# Patient Record
Sex: FEMALE | Race: WHITE | Employment: OTHER | URBAN - METROPOLITAN AREA
[De-identification: names, ages, dates, MRNs, and addresses within clinical notes are randomized per-mention and may not be internally consistent; named-entity substitution may affect disease eponyms.]

---

## 2018-11-05 ENCOUNTER — OFFICE VISIT (OUTPATIENT)
Dept: DERMATOLOGY | Facility: CLINIC | Age: 68
End: 2018-11-05
Payer: COMMERCIAL

## 2018-11-05 VITALS — HEART RATE: 71 BPM | DIASTOLIC BLOOD PRESSURE: 80 MMHG | SYSTOLIC BLOOD PRESSURE: 112 MMHG | OXYGEN SATURATION: 99 %

## 2018-11-05 DIAGNOSIS — L82.1 SK (SEBORRHEIC KERATOSIS): ICD-10-CM

## 2018-11-05 DIAGNOSIS — L57.0 AK (ACTINIC KERATOSIS): Primary | ICD-10-CM

## 2018-11-05 DIAGNOSIS — L81.4 LENTIGO: ICD-10-CM

## 2018-11-05 PROCEDURE — 99203 OFFICE O/P NEW LOW 30 MIN: CPT | Mod: 25 | Performed by: DERMATOLOGY

## 2018-11-05 PROCEDURE — 17000 DESTRUCT PREMALG LESION: CPT | Performed by: DERMATOLOGY

## 2018-11-05 PROCEDURE — 17003 DESTRUCT PREMALG LES 2-14: CPT | Performed by: DERMATOLOGY

## 2018-11-05 RX ORDER — MUPIROCIN 20 MG/G
OINTMENT TOPICAL
COMMUNITY
Start: 2016-03-18 | End: 2023-11-20

## 2018-11-05 RX ORDER — MAGNESIUM OXIDE 400 MG/1
400 TABLET ORAL
COMMUNITY
Start: 2017-05-25

## 2018-11-05 RX ORDER — DIPHENOXYLATE HYDROCHLORIDE AND ATROPINE SULFATE 2.5; .025 MG/1; MG/1
1 TABLET ORAL
COMMUNITY
Start: 2014-07-30

## 2018-11-05 RX ORDER — CITALOPRAM HYDROBROMIDE 40 MG/1
40 TABLET ORAL
COMMUNITY
Start: 2018-08-16

## 2018-11-05 RX ORDER — CLONAZEPAM 0.5 MG/1
TABLET ORAL
COMMUNITY
Start: 2018-08-16

## 2018-11-05 NOTE — LETTER
11/5/2018         RE: Domi Nolen  [address] [place] [id number]        Dear Colleague,    Thank you for referring your patient, Domi Nolen, to the [facility]. Please see a copy of my visit note below.    Domi Nolen is a 68 year old year old female patient here today for spot on nose.   .  Patient states this has been present for a while.  Patient reports the following symptoms:  Crusting and scalp .  Patient reports the following previous treatments none.  Patient reports the following modifying factors none.  Associated symptoms: none.  Patient has no other skin complaints today.  Remainder of the HPI, Meds, PMH, Allergies, FH, and SH was reviewed in chart.    History reviewed. No pertinent past medical history.    History reviewed. No pertinent surgical history.     Family History   Problem Relation Age of Onset     Cancer Mother      skin ca     Cancer Father      skin ca     Melanoma Maternal Aunt        Social History     Social History     Marital status:      Spouse name: N/A     Number of children: N/A     Years of education: N/A     Occupational History     Not on file.     Social History Main Topics     Smoking status: Never Smoker     Smokeless tobacco: Never Used     Alcohol use Not on file     Drug use: Not on file     Sexual activity: Not on file     Other Topics Concern     Not on file     Social History Narrative     No narrative on file       Outpatient Encounter Prescriptions as of 11/5/2018   Medication Sig Dispense Refill     ALPRAZolam (XANAX PO)        calcium carbonate 600 mg-vitamin D 400 units (CALTRATE) 600-400 MG-UNIT per tablet Take 1 tablet by mouth       citalopram (CELEXA) 40 MG tablet Take 40 mg by mouth       clonazePAM (KLONOPIN) 0.5 MG tablet TAKE 1 TABLET ORALLY at bedtime  FOR RESTLESS LEG SYNDROME OR ANXIETY AS NEEDED       magnesium oxide (MAG-OX) 400 MG tablet Take 400 mg by mouth       Multiple Vitamin (MULTI-VITAMINS) TABS Take  1 tablet by mouth       mupirocin (BACTROBAN) 2 % ointment        No facility-administered encounter medications on file as of 11/5/2018.              Review Of Systems  Skin: As above  Eyes: negative  Ears/Nose/Throat: negative  Respiratory: No shortness of breath, dyspnea on exertion, cough, or hemoptysis  Cardiovascular: negative  Gastrointestinal: negative  Genitourinary: negative  Musculoskeletal: negative  Neurologic: negative  Psychiatric: negative  Hematologic/Lymphatic/Immunologic: negative  Endocrine: negative      O:   NAD, WDWN, Alert & Oriented, Mood & Affect wnl, Vitals stable   Here today alone   /80 (BP Location: Right arm, Patient Position: Sitting, Cuff Size: Adult Regular)  Pulse 71  SpO2 99%   General appearance normal   Vitals stable   Alert, oriented and in no acute distress     R nasal tip and bridge scaly papule   Stuck on papules and brown macules on trunk and ext      The remainder of expanded problem focused exam was unremarkable; the following areas were examined:  scalp/hair, conjunctiva/lids, face, neck, lips      Eyes: Conjunctivae/lids:Normal     ENT: Lips, buccal mucosa, tongue: normal    MSK:Normal    Cardiovascular: peripheral edema none    Pulm: Breathing Normal    Lymph Nodes: No Head and Neck Lymphadenopathy     Neuro/Psych: Orientation:Normal; Mood/Affect:Normal      A/P:  1. Actinic keratosis x2  LN2:  Treated with LN2 for 5s for 1-2 cycles. Warned risks of blistering, pain, pigment change, scarring, and incomplete resolution.  Advised patient to return if lesions do not completely resolve.  Wound care sheet given.  Pathophysiology discussed with pateint   1. Seborrheic keratosis, lentigo,   BENIGN LESIONS DISCUSSED WITH PATIENT:  I discussed the specifics of tumor, prognosis, and genetics of benign lesions.  I explained that treatment of these lesions would be purely cosmetic and not medically neccessary.  I discussed with patient different removal options including  excision, cautery and /or laser.      Nature and genetics of benign skin lesions dicussed with patient.  Signs and Symptoms of skin cancer discussed with patient.  ABCDEs of melanoma reviewed with patient.  Patient encouraged to perform monthly skin exams.  UV precautions reviewed with patient.  Patient to follow up with Primary Care provider regarding elevated blood pressure.  Skin care regimen reviewed with patient: Eliminate harsh soaps, i.e. Dial, zest, irsih spring; Mild soaps such as Cetaphil or Dove sensitive skin, avoid hot or cold showers, aggressive use of emollients including vanicream, cetaphil or cerave discussed with patient.    Risks of non-melanoma skin cancer discussed with patient   Return to clinic 3 months  Patient to follow up with Primary Care provider regarding elevated blood pressure.        Again, thank you for allowing me to participate in the care of your patient.        Sincerely,        James Dominguez MD

## 2018-11-05 NOTE — NURSING NOTE
Initial /80 (BP Location: Right arm, Patient Position: Sitting, Cuff Size: Adult Regular)  Pulse 71  SpO2 99% There is no height or weight on file to calculate BMI. .

## 2018-11-05 NOTE — MR AVS SNAPSHOT
After Visit Summary   11/5/2018    Domi Nolen    MRN: [id number]           Patient Information     Date Of Birth          1950        Visit Information        Provider Department      11/5/2018 10:45 AM James Dominguez MD [facility]        Today's Diagnoses     AK (actinic keratosis)    -  1    Lentigo        SK (seborrheic keratosis)          Care Instructions    WOUND CARE INSTRUCTIONS   FOR CRYOSURGERY   This area treated with liquid nitrogen should form a blister (areas treated may or may not blister-skin may just turn dark and slough off). You do not need to bandage the area unless a blister forms and breaks (which may be a few days). When the blister breaks, begin daily dressing changes as follows:  1) Clean and dry the area with tap water using clean Q-tip or sterile gauze pad.   2) Apply Polysporin ointment or Bacitracin ointment over entire wound. Do NOT use Neosporin ointment.   3) Cover the wound with a band-aid or sterile non-stick gauze pad and micropore paper tape.   REPEAT THESE INSTRUCTIONS AT LEAST ONCE A DAY UNTIL THE WOUND HAS COMPLETELY HEALED.   It is an old wives tale that a wound heals better when it is exposed to air and allowed to dry out. The wound will heal faster with a better cosmetic result if it is kept moist with ointment and covered with a bandage.   Do not let the wound dry out.   IMPORTANT INFORMATION ON REVERSE SIDE   Supplies Needed:   *Cotton tipped applicators (Q-tips)   *Polysporin ointment or Bacitracin ointment (NOT NEOSPORIN)   *Band-aids, or non stick gauze pads and micropore paper tape   PATIENT INFORMATION   During the healing process you will notice a number of changes. All wounds develop a small halo of redness surrounding the wound. This means healing is occurring. Severe itching with extensive redness usually indicates sensitivity to the ointment or bandage tape used to dress the wound. You should call our office if this  develops.   Swelling and/or discoloration around your surgical site is common, particularly when performed around the eye.   All wounds normally drain. The larger the wound the more drainage there will be. After 7-10 days, you will notice the wound beginning to shrink and new skin will begin to grow. The wound is healed when you can see skin has formed over the entire area. A healed wound has a healthy, shiny look to the surface and is red to dark pink in color to normalize. Wounds may take approximately 4-6 weeks to heal. Larger wounds may take 6-8 weeks. After the wound is healed you may discontinue dressing changes.   You may experience a sensation of tightness as your wound heals. This is normal and will gradually subside.   Your healed wound may be sensitive to temperature changes. This sensitivity improves with time, but if you re having a lot of discomfort, try to avoid temperature extremes.   Patients frequently experience itching after their wound appears to have healed because of the continue healing under the skin. Plain Vaseline will help relieve the itching.                 Follow-ups after your visit        Who to contact     If you have questions or need follow up information about today's clinic visit or your schedule please contact [facility] directly at [phone].  Normal or non-critical lab and imaging results will be communicated to you by MyChart, letter or phone within 4 business days after the clinic has received the results. If you do not hear from us within 7 days, please contact the clinic through [facility] or phone. If you have a critical or abnormal lab result, we will notify you by phone as soon as possible.  Submit refill requests through [employer] or call your pharmacy and they will forward the refill request to us. Please allow 3 business days for your refill to be completed.          Additional Information About Your Visit        [employer] Information     [employer] lets you  "send messages to your doctor, view your test results, renew your prescriptions, schedule appointments and more. To sign up, go to [email or web address] . Click on \"Log in\" on the left side of the screen, which will take you to the Welcome page. Then click on \"Sign up Now\" on the right side of the page.     You will be asked to enter the access code listed below, as well as some personal information. Please follow the directions to create your username and password.     Your access code is: [email or web address]  Expires: 2/3/2019 10:54 AM     Your access code will  in 90 days. If you need help or a new code, please call your [facility] clinic or [phone].        Care EveryWhere ID     This is your [facility] EveryWhere ID. This could be used by other organizations to access your [facility] medical records  [id number]        Your Vitals Were     Pulse Pulse Oximetry                71 99%           Blood Pressure from Last 3 Encounters:   18 112/80    Weight from Last 3 Encounters:   No data found for Wt              We Performed the Following     DESTRUCT PREMALIGNANT LESION, 2-14     DESTRUCT PREMALIGNANT LESION, FIRST        Primary Care Provider Office Phone # Fax #    Rachel KATE Jaime [phone]       [facility] [address] [address]        Equal Access to Services     RADHA BRAUN : Hadii aad ku hadasho Soomaali, waaxda luqadaha, qaybta kaalmada adenabor, benito kirkpatrick . So [facility] [phone].    ATENCIÓN: Si habla español, tiene a mike disposición servicios gratuitos de asistencia lingüística. Tony al [phone].    We comply with applicable federal civil rights laws and [place] laws. We do not discriminate on the basis of race, color, national origin, age, disability, sex, sexual orientation, or gender identity.            Thank you!     Thank you for choosing [facility]  for your care. Our goal is always to provide " you with excellent care. Hearing back from our patients is one way we can continue to improve our services. Please take a few minutes to complete the written survey that you may receive in the mail after your visit with us. Thank you!             Your Updated Medication List - Protect others around you: Learn how to safely use, store and throw away your medicines at www.disposemymeds.org.          This list is accurate as of 11/5/18 11:17 AM.  Always use your most recent med list.                   Brand Name Dispense Instructions for use Diagnosis    calcium carbonate 600 mg-vitamin D 400 units 600-400 MG-UNIT per tablet    CALTRATE     Take 1 tablet by mouth    AK (actinic keratosis), Lentigo, SK (seborrheic keratosis)       citalopram 40 MG tablet    celeXA     Take 40 mg by mouth    AK (actinic keratosis), Lentigo, SK (seborrheic keratosis)       clonazePAM 0.5 MG tablet    klonoPIN     TAKE 1 TABLET ORALLY at bedtime  FOR RESTLESS LEG SYNDROME OR ANXIETY AS NEEDED    AK (actinic keratosis), Lentigo, SK (seborrheic keratosis)       magnesium oxide 400 MG tablet    MAG-OX     Take 400 mg by mouth    AK (actinic keratosis), Lentigo, SK (seborrheic keratosis)       MULTI-VITAMINS Tabs      Take 1 tablet by mouth    AK (actinic keratosis), Lentigo, SK (seborrheic keratosis)       mupirocin 2 % ointment    BACTROBAN      AK (actinic keratosis), Lentigo, SK (seborrheic keratosis)       XANAX PO       AK (actinic keratosis), Lentigo, SK (seborrheic keratosis)

## 2018-11-05 NOTE — PROGRESS NOTES
Domi Nolen is a 68 year old year old female patient here today for spot on nose.   .  Patient states this has been present for a while.  Patient reports the following symptoms:  Crusting and scalp .  Patient reports the following previous treatments none.  Patient reports the following modifying factors none.  Associated symptoms: none.  Patient has no other skin complaints today.  Remainder of the HPI, Meds, PMH, Allergies, FH, and SH was reviewed in chart.    History reviewed. No pertinent past medical history.    History reviewed. No pertinent surgical history.     Family History   Problem Relation Age of Onset     Cancer Mother      skin ca     Cancer Father      skin ca     Melanoma Maternal Aunt        Social History     Social History     Marital status:      Spouse name: N/A     Number of children: N/A     Years of education: N/A     Occupational History     Not on file.     Social History Main Topics     Smoking status: Never Smoker     Smokeless tobacco: Never Used     Alcohol use Not on file     Drug use: Not on file     Sexual activity: Not on file     Other Topics Concern     Not on file     Social History Narrative     No narrative on file       Outpatient Encounter Prescriptions as of 11/5/2018   Medication Sig Dispense Refill     ALPRAZolam (XANAX PO)        calcium carbonate 600 mg-vitamin D 400 units (CALTRATE) 600-400 MG-UNIT per tablet Take 1 tablet by mouth       citalopram (CELEXA) 40 MG tablet Take 40 mg by mouth       clonazePAM (KLONOPIN) 0.5 MG tablet TAKE 1 TABLET ORALLY at bedtime  FOR RESTLESS LEG SYNDROME OR ANXIETY AS NEEDED       magnesium oxide (MAG-OX) 400 MG tablet Take 400 mg by mouth       Multiple Vitamin (MULTI-VITAMINS) TABS Take 1 tablet by mouth       mupirocin (BACTROBAN) 2 % ointment        No facility-administered encounter medications on file as of 11/5/2018.              Review Of Systems  Skin: As above  Eyes: negative  Ears/Nose/Throat:  negative  Respiratory: No shortness of breath, dyspnea on exertion, cough, or hemoptysis  Cardiovascular: negative  Gastrointestinal: negative  Genitourinary: negative  Musculoskeletal: negative  Neurologic: negative  Psychiatric: negative  Hematologic/Lymphatic/Immunologic: negative  Endocrine: negative      O:   NAD, WDWN, Alert & Oriented, Mood & Affect wnl, Vitals stable   Here today alone   /80 (BP Location: Right arm, Patient Position: Sitting, Cuff Size: Adult Regular)  Pulse 71  SpO2 99%   General appearance normal   Vitals stable   Alert, oriented and in no acute distress     R nasal tip and bridge scaly papule   Stuck on papules and brown macules on trunk and ext      The remainder of expanded problem focused exam was unremarkable; the following areas were examined:  scalp/hair, conjunctiva/lids, face, neck, lips      Eyes: Conjunctivae/lids:Normal     ENT: Lips, buccal mucosa, tongue: normal    MSK:Normal    Cardiovascular: peripheral edema none    Pulm: Breathing Normal    Lymph Nodes: No Head and Neck Lymphadenopathy     Neuro/Psych: Orientation:Normal; Mood/Affect:Normal      A/P:  1. Actinic keratosis x2  LN2:  Treated with LN2 for 5s for 1-2 cycles. Warned risks of blistering, pain, pigment change, scarring, and incomplete resolution.  Advised patient to return if lesions do not completely resolve.  Wound care sheet given.  Pathophysiology discussed with pateint   1. Seborrheic keratosis, lentigo,   BENIGN LESIONS DISCUSSED WITH PATIENT:  I discussed the specifics of tumor, prognosis, and genetics of benign lesions.  I explained that treatment of these lesions would be purely cosmetic and not medically neccessary.  I discussed with patient different removal options including excision, cautery and /or laser.      Nature and genetics of benign skin lesions dicussed with patient.  Signs and Symptoms of skin cancer discussed with patient.  ABCDEs of melanoma reviewed with patient.  Patient  encouraged to perform monthly skin exams.  UV precautions reviewed with patient.  Patient to follow up with Primary Care provider regarding elevated blood pressure.  Skin care regimen reviewed with patient: Eliminate harsh soaps, i.e. Dial, zest, irsih spring; Mild soaps such as Cetaphil or Dove sensitive skin, avoid hot or cold showers, aggressive use of emollients including vanicream, cetaphil or cerave discussed with patient.    Risks of non-melanoma skin cancer discussed with patient   Return to clinic 3 months  Patient to follow up with Primary Care provider regarding elevated blood pressure.

## 2021-02-09 ENCOUNTER — OFFICE VISIT (OUTPATIENT)
Dept: DERMATOLOGY | Facility: CLINIC | Age: 71
End: 2021-02-09
Payer: COMMERCIAL

## 2021-02-09 VITALS — SYSTOLIC BLOOD PRESSURE: 124 MMHG | DIASTOLIC BLOOD PRESSURE: 78 MMHG | HEART RATE: 63 BPM | RESPIRATION RATE: 16 BRPM

## 2021-02-09 DIAGNOSIS — D22.9 MULTIPLE BENIGN NEVI: ICD-10-CM

## 2021-02-09 DIAGNOSIS — L81.4 LENTIGO: ICD-10-CM

## 2021-02-09 DIAGNOSIS — D18.01 CHERRY ANGIOMA: ICD-10-CM

## 2021-02-09 DIAGNOSIS — L82.1 SEBORRHEIC KERATOSIS: Primary | ICD-10-CM

## 2021-02-09 PROCEDURE — 99213 OFFICE O/P EST LOW 20 MIN: CPT | Performed by: PHYSICIAN ASSISTANT

## 2021-02-09 NOTE — NURSING NOTE
Initial /78 (BP Location: Left arm, Patient Position: Sitting, Cuff Size: Adult Large)   Pulse 63   Resp 16  There is no height or weight on file to calculate BMI. .    Betty García, CMA

## 2021-02-09 NOTE — LETTER
2/9/2021         RE: Domi Nolen  [address] [facility] [address]        Dear Colleague,    Thank you for referring your patient, Domi Nolen, to the [facility]. Please see a copy of my visit note below.    Domi Nolen is an extremely pleasant 70 year old year old female patient here today for spots on chest. Present for past few months. Denies any pain or bleeding. Patient has no other skin complaints today.  Remainder of the HPI, Meds, PMH, Allergies, FH, and SH was reviewed in chart.   No past medical history on file.    No past surgical history on file.     Family History   Problem Relation Age of Onset     Cancer Mother         skin ca     Cancer Father         skin ca     Melanoma Maternal Aunt        Social History     Socioeconomic History     Marital status:      Spouse name: Not on file     Number of children: Not on file     Years of education: Not on file     Highest education level: Not on file   Occupational History     Not on file   Social Needs     Financial resource strain: Not on file     Food insecurity     Worry: Not on file     Inability: Not on file     Transportation needs     Medical: Not on file     Non-medical: Not on file   Tobacco Use     Smoking status: Never Smoker     Smokeless tobacco: Never Used   Substance and Sexual Activity     Alcohol use: Not on file     Drug use: Not on file     Sexual activity: Not on file   Lifestyle     Physical activity     Days per week: Not on file     Minutes per session: Not on file     Stress: Not on file   Relationships     Social connections     Talks on phone: Not on file     Gets together: Not on file     Attends Christian service: Not on file     Active member of club or organization: Not on file     Attends meetings of clubs or organizations: Not on file     Relationship status: Not on file     Intimate partner violence     Fear of current or ex partner: Not on file     Emotionally abused: Not on  file     Physically abused: Not on file     Forced sexual activity: Not on file   Other Topics Concern     Not on file   Social History Narrative     Not on file       Outpatient Encounter Medications as of 2/9/2021   Medication Sig Dispense Refill     ALPRAZolam (XANAX PO)        calcium carbonate 600 mg-vitamin D 400 units (CALTRATE) 600-400 MG-UNIT per tablet Take 1 tablet by mouth       citalopram (CELEXA) 40 MG tablet Take 40 mg by mouth       clonazePAM (KLONOPIN) 0.5 MG tablet TAKE 1 TABLET ORALLY at bedtime  FOR RESTLESS LEG SYNDROME OR ANXIETY AS NEEDED       magnesium oxide (MAG-OX) 400 MG tablet Take 400 mg by mouth       Multiple Vitamin (MULTI-VITAMINS) TABS Take 1 tablet by mouth       mupirocin (BACTROBAN) 2 % ointment        No facility-administered encounter medications on file as of 2/9/2021.              Review Of Systems  Skin: As above  Eyes: negative  Ears/Nose/Throat: negative  Respiratory: No shortness of breath, dyspnea on exertion, cough      O:   NAD, WDWN, Alert & Oriented, Mood & Affect wnl, Vitals stable   Here today alone   /78 (BP Location: Left arm, Patient Position: Sitting, Cuff Size: Adult Large)   Pulse 63   Resp 16    General appearance normal   Vitals stable   Alert, oriented and in no acute distress     Stuck on papules and brown macules on trunk and ext   Red papules on trunk  Brown papules and macules with regular pigment network and borders    Upper body skin exam is normal     Eyes: Conjunctivae/lids:Normal     ENT: Lips: normal    MSK:Normal    Pulm: Breathing Normal    Neuro/Psych: Orientation:Alert and Orientedx3 ; Mood/Affect:normal   A/P:  1. Seborrheic keratosis, lentigo, angioma, benign nevi   BENIGN LESIONS DISCUSSED WITH PATIENT:  I discussed the specifics of tumor, prognosis, and genetics of benign lesions.  I explained that treatment of these lesions would be purely cosmetic and not medically neccessary.  I discussed with patient different removal  options including excision, cautery and /or laser.      Nature and genetics of benign skin lesions dicussed with patient.  Signs and Symptoms of skin cancer discussed with patient.  ABCDEs of melanoma reviewed with patient.  Patient encouraged to perform monthly skin exams.  UV precautions reviewed with patient.  Skin care regimen reviewed with patient: Eliminate harsh soaps, i.e. Dial, zest, irsih spring; Mild soaps such as Cetaphil or Dove sensitive skin, avoid hot or cold showers, aggressive use of emollients including vanicream, cetaphil or cerave discussed with patient.    Risks of non-melanoma skin cancer discussed with patient   Return to clinic in one year or sooner if needed.           Again, thank you for allowing me to participate in the care of your patient.        Sincerely,        Cristina Cartagena PA-C

## 2021-02-15 NOTE — PROGRESS NOTES
Domi Nolen is an extremely pleasant 70 year old year old female patient here today for spots on chest. Present for past few months. Denies any pain or bleeding. Patient has no other skin complaints today.  Remainder of the HPI, Meds, PMH, Allergies, FH, and SH was reviewed in chart.   No past medical history on file.    No past surgical history on file.     Family History   Problem Relation Age of Onset     Cancer Mother         skin ca     Cancer Father         skin ca     Melanoma Maternal Aunt        Social History     Socioeconomic History     Marital status:      Spouse name: Not on file     Number of children: Not on file     Years of education: Not on file     Highest education level: Not on file   Occupational History     Not on file   Social Needs     Financial resource strain: Not on file     Food insecurity     Worry: Not on file     Inability: Not on file     Transportation needs     Medical: Not on file     Non-medical: Not on file   Tobacco Use     Smoking status: Never Smoker     Smokeless tobacco: Never Used   Substance and Sexual Activity     Alcohol use: Not on file     Drug use: Not on file     Sexual activity: Not on file   Lifestyle     Physical activity     Days per week: Not on file     Minutes per session: Not on file     Stress: Not on file   Relationships     Social connections     Talks on phone: Not on file     Gets together: Not on file     Attends Faith service: Not on file     Active member of club or organization: Not on file     Attends meetings of clubs or organizations: Not on file     Relationship status: Not on file     Intimate partner violence     Fear of current or ex partner: Not on file     Emotionally abused: Not on file     Physically abused: Not on file     Forced sexual activity: Not on file   Other Topics Concern     Not on file   Social History Narrative     Not on file       Outpatient Encounter Medications as of 2/9/2021   Medication Sig Dispense  Refill     ALPRAZolam (XANAX PO)        calcium carbonate 600 mg-vitamin D 400 units (CALTRATE) 600-400 MG-UNIT per tablet Take 1 tablet by mouth       citalopram (CELEXA) 40 MG tablet Take 40 mg by mouth       clonazePAM (KLONOPIN) 0.5 MG tablet TAKE 1 TABLET ORALLY at bedtime  FOR RESTLESS LEG SYNDROME OR ANXIETY AS NEEDED       magnesium oxide (MAG-OX) 400 MG tablet Take 400 mg by mouth       Multiple Vitamin (MULTI-VITAMINS) TABS Take 1 tablet by mouth       mupirocin (BACTROBAN) 2 % ointment        No facility-administered encounter medications on file as of 2/9/2021.              Review Of Systems  Skin: As above  Eyes: negative  Ears/Nose/Throat: negative  Respiratory: No shortness of breath, dyspnea on exertion, cough      O:   NAD, WDWN, Alert & Oriented, Mood & Affect wnl, Vitals stable   Here today alone   /78 (BP Location: Left arm, Patient Position: Sitting, Cuff Size: Adult Large)   Pulse 63   Resp 16    General appearance normal   Vitals stable   Alert, oriented and in no acute distress     Stuck on papules and brown macules on trunk and ext   Red papules on trunk  Brown papules and macules with regular pigment network and borders    Upper body skin exam is normal     Eyes: Conjunctivae/lids:Normal     ENT: Lips: normal    MSK:Normal    Pulm: Breathing Normal    Neuro/Psych: Orientation:Alert and Orientedx3 ; Mood/Affect:normal   A/P:  1. Seborrheic keratosis, lentigo, angioma, benign nevi   BENIGN LESIONS DISCUSSED WITH PATIENT:  I discussed the specifics of tumor, prognosis, and genetics of benign lesions.  I explained that treatment of these lesions would be purely cosmetic and not medically neccessary.  I discussed with patient different removal options including excision, cautery and /or laser.      Nature and genetics of benign skin lesions dicussed with patient.  Signs and Symptoms of skin cancer discussed with patient.  ABCDEs of melanoma reviewed with patient.  Patient encouraged to  perform monthly skin exams.  UV precautions reviewed with patient.  Skin care regimen reviewed with patient: Eliminate harsh soaps, i.e. Dial, zest, irsih spring; Mild soaps such as Cetaphil or Dove sensitive skin, avoid hot or cold showers, aggressive use of emollients including vanicream, cetaphil or cerave discussed with patient.    Risks of non-melanoma skin cancer discussed with patient   Return to clinic in one year or sooner if needed.

## 2021-09-21 ENCOUNTER — HOSPITAL ENCOUNTER (OUTPATIENT)
Dept: CT IMAGING | Facility: CLINIC | Age: 71
Discharge: HOME | End: 2021-09-21
Attending: FAMILY MEDICINE | Admitting: FAMILY MEDICINE
Payer: MEDICARE

## 2021-09-21 DIAGNOSIS — Z13.6 SCREENING FOR HEART DISEASE: ICD-10-CM

## 2021-09-21 PROCEDURE — 75571 CT HRT W/O DYE W/CA TEST: CPT | Mod: 26 | Performed by: INTERNAL MEDICINE

## 2021-09-21 PROCEDURE — 75571 CT HRT W/O DYE W/CA TEST: CPT

## 2021-10-31 ENCOUNTER — HEALTH MAINTENANCE LETTER (OUTPATIENT)
Age: 71
End: 2021-10-31

## 2022-10-23 ENCOUNTER — HEALTH MAINTENANCE LETTER (OUTPATIENT)
Age: 72
End: 2022-10-23

## 2022-12-10 ENCOUNTER — HEALTH MAINTENANCE LETTER (OUTPATIENT)
Age: 72
End: 2022-12-10

## 2023-06-26 ENCOUNTER — OFFICE VISIT (OUTPATIENT)
Dept: OBGYN | Facility: CLINIC | Age: 73
End: 2023-06-26
Payer: COMMERCIAL

## 2023-06-26 VITALS
BODY MASS INDEX: 25.61 KG/M2 | SYSTOLIC BLOOD PRESSURE: 122 MMHG | HEART RATE: 60 BPM | HEIGHT: 68 IN | TEMPERATURE: 97.5 F | DIASTOLIC BLOOD PRESSURE: 77 MMHG | WEIGHT: 169 LBS | RESPIRATION RATE: 18 BRPM

## 2023-06-26 DIAGNOSIS — R97.8 OTHER ABNORMAL TUMOR MARKERS: ICD-10-CM

## 2023-06-26 DIAGNOSIS — C50.612 MALIGNANT NEOPLASM OF AXILLARY TAIL OF LEFT FEMALE BREAST, UNSPECIFIED ESTROGEN RECEPTOR STATUS (H): ICD-10-CM

## 2023-06-26 DIAGNOSIS — R19.03 RIGHT LOWER QUADRANT ABDOMINAL SWELLING, MASS AND LUMP: ICD-10-CM

## 2023-06-26 DIAGNOSIS — N83.8 OVARIAN MASS, RIGHT: Primary | ICD-10-CM

## 2023-06-26 LAB
CANCER AG125 SERPL-ACNC: 6 U/ML
CEA SERPL-MCNC: 4.3 NG/ML

## 2023-06-26 PROCEDURE — 36415 COLL VENOUS BLD VENIPUNCTURE: CPT | Performed by: STUDENT IN AN ORGANIZED HEALTH CARE EDUCATION/TRAINING PROGRAM

## 2023-06-26 PROCEDURE — 99000 SPECIMEN HANDLING OFFICE-LAB: CPT | Performed by: STUDENT IN AN ORGANIZED HEALTH CARE EDUCATION/TRAINING PROGRAM

## 2023-06-26 PROCEDURE — 86304 IMMUNOASSAY TUMOR CA 125: CPT | Performed by: STUDENT IN AN ORGANIZED HEALTH CARE EDUCATION/TRAINING PROGRAM

## 2023-06-26 PROCEDURE — 82378 CARCINOEMBRYONIC ANTIGEN: CPT | Performed by: STUDENT IN AN ORGANIZED HEALTH CARE EDUCATION/TRAINING PROGRAM

## 2023-06-26 PROCEDURE — 86301 IMMUNOASSAY TUMOR CA 19-9: CPT | Mod: 90 | Performed by: STUDENT IN AN ORGANIZED HEALTH CARE EDUCATION/TRAINING PROGRAM

## 2023-06-26 PROCEDURE — 99214 OFFICE O/P EST MOD 30 MIN: CPT | Performed by: STUDENT IN AN ORGANIZED HEALTH CARE EDUCATION/TRAINING PROGRAM

## 2023-06-26 RX ORDER — ASPIRIN 81 MG/1
81 TABLET ORAL
COMMUNITY
Start: 2022-09-26

## 2023-06-26 RX ORDER — PANTOPRAZOLE SODIUM 40 MG/1
40 TABLET, DELAYED RELEASE ORAL
COMMUNITY
Start: 2023-06-08

## 2023-06-26 RX ORDER — ATORVASTATIN CALCIUM 20 MG/1
TABLET, FILM COATED ORAL
COMMUNITY
Start: 2023-06-08

## 2023-06-26 NOTE — NURSING NOTE
"Initial /77 (BP Location: Right arm, Patient Position: Chair, Cuff Size: Adult Regular)   Pulse 60   Temp 97.5  F (36.4  C) (Tympanic)   Resp 18   Ht 1.727 m (5' 8\")   Wt 76.7 kg (169 lb)   BMI 25.70 kg/m   Estimated body mass index is 25.7 kg/m  as calculated from the following:    Height as of this encounter: 1.727 m (5' 8\").    Weight as of this encounter: 76.7 kg (169 lb). .      "

## 2023-06-26 NOTE — PROGRESS NOTES
[facility]  Gynecology Office Note    Assessment and Plan:   Domi Nolen, 73 year old G0 female, was found to have moderate sized complex right ovarian cyst on MRI during her workup for her left hip pain. However, this right ovarian cyst might be causing her symptoms.     Pelvic US, , CA 19-9, CEA levels ordered. I anticipate that this right ovarian cyst will resuscitate removal if it appears complex on US and/or if tumor markers are elevated, especially in the setting that she has been having RLQ pain. This most likely will need to be done with the GynOnc team due to her complex surgical history. Will place GynOnc referral once lab and imaging results are back.    Diana Ortiz MD  Obstetrics and Gynecology  [facility]   06/26/2023     -----------------------------------------------------------------------------------------------------------------------------------    HPI: Domi Nolen, 73 year old G0 female, was found to have moderate sized complex right ovarian cyst on MRI during her workup for her left hip pain. She has been having RLQ abdominal pelvic for the past 2 years. She would have occasional RLQ sharp pain to the point that makes her double over. She sleep on left side because of the pain. No unintentional weight gain or weight loss.     ROS: A 10 pt ROS was completed and found to be otherwise negative unless mentioned in the HPI.     OBHx: G0. Infertility. Did infertility testing 40 years ago, did have a right ovarian cyst that was drained.    GYN Hx:   Patient went through menopause in her early 50s. Did have vaginal bleeding once in 1998 that was worked up with EMB, which returned negative.    Abnormal Pap Smears: Yes. Cryotherapy. Stopped around age 66.    Sexual Activity: currently not sexually active  Sexually Transmitted Infections: genital HPV warts    PMH:   Per patient report:  -Breast cancer 1999 s/p mastectomy and left breast  reconstruction. No chemotherapy but on Tamoxifen for 4-5 years.  -GERD  -Left hip problems that needs to be replaced  -Colonoscopy 3 years ago, repeat in 5 years  -CVD?  -Problems with getting out of anestheia.   -Restless leg syndrome  Per chart review, over active bladder, anxiety, IBS.    PSHx:   Laparoscopy for right ovarian cyst drainage many years ago  Left mastectomy with reconstruction using native tissue (big horizontal incision from hip to hip)  Laparoscopic Appendectomy  Open cholecystectomy, vascular tumor that resolved after stopping birth control  Open tubal ligation due to hitting a blood vessel  Hernia repair - pt said no mesh  Of note, care everywhere noted that pt had a  section, but pt indicated that she was never pregnant.   Medications:   calcium carbonate 600 mg-vitamin D 400 units (CALTRATE) 600-400 MG-UNIT per tablet, Take 1 tablet by mouth  citalopram (CELEXA) 40 MG tablet, Take 40 mg by mouth  clonazePAM (KLONOPIN) 0.5 MG tablet, TAKE 1 TABLET ORALLY at bedtime  FOR RESTLESS LEG SYNDROME OR ANXIETY AS NEEDED  magnesium oxide (MAG-OX) 400 MG tablet, Take 400 mg by mouth  Multiple Vitamin (MULTI-VITAMINS) TABS, Take 1 tablet by mouth  Atovastain   Per chart review, aspirin and pantoprazole.  Allergies   Allergen Reactions     Mirtazapine Other (See Comments)     Anger, homicidal     Duloxetine Anxiety     More anxiety, worse sleep.     Codeine Other (See Comments)     Abdominal Pain     Penicillins Other (See Comments)     Comment: Hives, Description:      Sulfanilamide Other (See Comments)     Comment: Rash, Description:    Social History: Denied smoking. Occasional alcohol use (5 per day, x2-3 times day), denied other recreational drug use.   from metastatic pancreatic cancer. Retired. No one lives her, but her dog  Family History: brother  from renal cancer, another brother renal and testicular cancer. Brother had heart surgery's. Another brother had heart stents.  "Sister had charocots  Mother  after surgery and cannot recover anesthesia.   Physical Exam:   Vitals:    23 0928   BP: 122/77   BP Location: Right arm   Patient Position: Chair   Cuff Size: Adult Regular   Pulse: 60   Resp: 18   Temp: 97.5  F (36.4  C)   TempSrc: Tympanic   Weight: 76.7 kg (169 lb)   Height: 1.727 m (5' 8\")      Estimated body mass index is 25.7 kg/m  as calculated from the following:    Height as of this encounter: 1.727 m (5' 8\").    Weight as of this encounter: 76.7 kg (169 lb).    General appearance: well-hydrated, A&O x 3, no apparent distress  Lungs: Equal expansion bilaterally, no accessory muscle use  Heart: No heaves or thrills.   Constitutional: See vitals  Abdomen: Soft, non-tender, non-distended. No rebound, rigidity, or guarding. Horizontal scar from hip to hip noted. Cholecystectomy scar noted.  Extremities: trace edema  Neuro: CN II-XII grossly intact  Genitourinary:  External genitalia: no erythema, no lesions.   Urethral meatus appropriate location without lesions or prolapse  Urethra: No masses, tenderness, or scarring  Bladder no fullness, masses, or tenderness.  Anus and Perineum: Unremarkable, no visible lesions  Vagina: Normal, healthy pink mucosa without any lesions. Physiologic vaginal discharge.   Cervix: normal appearance, no cervical motion tenderness.   Uterus: normal size, shape and consistency.   Adnexa: no tenderness bilaterally. Right adnexa feels full without distinct mass palpated.  Breast: left breast with reconstruction with nipple appear more pale compared to the right nipple        "

## 2023-06-27 LAB — CANCER AG19-9 SERPL IA-ACNC: 9 U/ML

## 2023-06-28 ENCOUNTER — HOSPITAL ENCOUNTER (OUTPATIENT)
Dept: ULTRASOUND IMAGING | Facility: CLINIC | Age: 73
Discharge: HOME | End: 2023-06-28
Attending: STUDENT IN AN ORGANIZED HEALTH CARE EDUCATION/TRAINING PROGRAM | Admitting: STUDENT IN AN ORGANIZED HEALTH CARE EDUCATION/TRAINING PROGRAM
Payer: MEDICARE

## 2023-06-28 DIAGNOSIS — N83.8 OVARIAN MASS, RIGHT: ICD-10-CM

## 2023-06-28 DIAGNOSIS — N83.8 OVARIAN MASS, RIGHT: Primary | ICD-10-CM

## 2023-06-28 PROCEDURE — 76830 TRANSVAGINAL US NON-OB: CPT

## 2023-09-28 ENCOUNTER — HOSPITAL ENCOUNTER (OUTPATIENT)
Dept: ULTRASOUND IMAGING | Facility: CLINIC | Age: 73
Discharge: HOME | End: 2023-09-28
Attending: STUDENT IN AN ORGANIZED HEALTH CARE EDUCATION/TRAINING PROGRAM | Admitting: STUDENT IN AN ORGANIZED HEALTH CARE EDUCATION/TRAINING PROGRAM
Payer: MEDICARE

## 2023-09-28 ENCOUNTER — MYC MEDICAL ADVICE (OUTPATIENT)
Dept: OBGYN | Facility: CLINIC | Age: 73
End: 2023-09-28
Payer: COMMERCIAL

## 2023-09-28 DIAGNOSIS — N83.201 RIGHT OVARIAN CYST: Primary | ICD-10-CM

## 2023-09-28 DIAGNOSIS — N83.8 OVARIAN MASS, RIGHT: ICD-10-CM

## 2023-09-28 PROCEDURE — 76830 TRANSVAGINAL US NON-OB: CPT

## 2023-10-03 ENCOUNTER — LAB (OUTPATIENT)
Dept: LAB | Facility: CLINIC | Age: 73
End: 2023-10-03
Payer: COMMERCIAL

## 2023-10-03 DIAGNOSIS — N83.201 RIGHT OVARIAN CYST: ICD-10-CM

## 2023-10-03 LAB — CANCER AG125 SERPL-ACNC: 6 U/ML

## 2023-10-03 PROCEDURE — 36415 COLL VENOUS BLD VENIPUNCTURE: CPT

## 2023-10-03 PROCEDURE — 86304 IMMUNOASSAY TUMOR CA 125: CPT

## 2023-11-01 ENCOUNTER — PATIENT OUTREACH (OUTPATIENT)
Dept: ONCOLOGY | Facility: CLINIC | Age: 73
End: 2023-11-01

## 2023-11-01 ENCOUNTER — OFFICE VISIT (OUTPATIENT)
Dept: OBGYN | Facility: CLINIC | Age: 73
End: 2023-11-01
Payer: COMMERCIAL

## 2023-11-01 VITALS
TEMPERATURE: 97.6 F | SYSTOLIC BLOOD PRESSURE: 115 MMHG | DIASTOLIC BLOOD PRESSURE: 75 MMHG | HEIGHT: 68 IN | RESPIRATION RATE: 16 BRPM | WEIGHT: 158.8 LBS | HEART RATE: 77 BPM | BODY MASS INDEX: 24.07 KG/M2

## 2023-11-01 DIAGNOSIS — N83.201 CYST OF RIGHT OVARY: Primary | ICD-10-CM

## 2023-11-01 DIAGNOSIS — R10.31 RLQ ABDOMINAL PAIN: ICD-10-CM

## 2023-11-01 PROCEDURE — 99213 OFFICE O/P EST LOW 20 MIN: CPT | Performed by: STUDENT IN AN ORGANIZED HEALTH CARE EDUCATION/TRAINING PROGRAM

## 2023-11-01 NOTE — NURSING NOTE
"Initial /75 (BP Location: Right arm, Patient Position: Chair, Cuff Size: Adult Regular)   Pulse 77   Temp 97.6  F (36.4  C) (Tympanic)   Resp 16   Ht 1.727 m (5' 8\")   Wt 72 kg (158 lb 12.8 oz)   BMI 24.15 kg/m   Estimated body mass index is 24.15 kg/m  as calculated from the following:    Height as of this encounter: 1.727 m (5' 8\").    Weight as of this encounter: 72 kg (158 lb 12.8 oz). .  Kaitlin Barragan CMA    "

## 2023-11-01 NOTE — PROGRESS NOTES
"[facility]  Gynecology Office Note    Assessment and Plan:   Domi Nolen, 73 year old G0 female, was found to have incidental right ovarian cyst on MRI 6/13/2023 during her workup for her left hip pain. Initially, her RLQ pain was thought to be from hip related (her left hip was replaced in 8/2023, right hip not replaced). However, upon ongoing monitoring of her right ovarian cyst via ultrasound, the ovarian cyst appeared to be growing in size despite her  unchanged. Pt's RLQ pain is also more concerning for intra-abdominal pathology now too with her ongoing B symptoms (weight loss, bloating, early satiety). Due to her difficulty with anesthesia, I recommended BSO to be performed with GynOnc team with full anesthesia team in house. We [place] [facility] [place]. GynOnc team might perform frozen pathology to help with guidance on exactly the type of surgery. GynOnc referral placed.    >15min was used for face to face patient encounter with counseling and answering her questions.    Diana Ortiz MD  Obstetrics and Gynecology  [facility]   11/01/2023     -----------------------------------------------------------------------------------------------------------------------------------    S: Domi Nolen, 73 year old G0 female, was found to have moderate sized complex right ovarian cyst on MRI 6/13/2023 during her workup for her left hip pain. She has been having RLQ abdominal pelvic for the past 2 years. She would have occasional RLQ sharp pain to the point that makes her double over. She sleep on left side because of the pain. No unintentional weight gain or weight loss.    She saw me on 6/26/2023 for consultation of this incidentally found right ovarian mass. Her , CEA, and CA 19-9 were within normal range. On 6/28/2023 pelvic ultrasound showed \" Minimally complex right adnexal/ovarian cyst measuring up to 3.9cm\". On 9/28/2023, the " "repeat pelvic ultrasound showed 5.2 x 2.7 x 5.0 cm right ovarian with 4.5 x 2.4 x 3.9 cm anechoic structure with  some internal debris. Her  on 10/3/2023 was unchanged.     Today, she endorsed worsening RLQ pain radiating to her back, bloating, early satiety, constipation, and ?intentional weight loss of 8lb since 8/2023.  O:  Vitals:    11/01/23 0845   BP: 115/75   BP Location: Right arm   Patient Position: Chair   Cuff Size: Adult Regular   Pulse: 77   Resp: 16   Temp: 97.6  F (36.4  C)   TempSrc: Tympanic   Weight: 72 kg (158 lb 12.8 oz)   Height: 1.727 m (5' 8\")      Estimated body mass index is 24.15 kg/m  as calculated from the following:    Height as of this encounter: 1.727 m (5' 8\").    Weight as of this encounter: 72 kg (158 lb 12.8 oz).    General appearance: well-hydrated, A&O x 3, no apparent distress  Lungs: Equal expansion bilaterally, no accessory muscle use  Heart: No heaves or thrills.   Constitutional: See vitals  Abdomen: Soft, non-tender, non-distended. No rebound, rigidity, or guarding.    Labs/Imaging:    Latest Reference Range & Units 06/26/23 10:19 10/03/23 11:41    <=38 U/mL 6 6   Cancer Antigen 19-9 <=35 U/mL 9    CEA ng/mL 4.3      Narrative & Impression   US PELVIC TRANSABDOMINAL AND TRANSVAGINAL 9/28/2023 10:45 AM     CLINICAL HISTORY: Ovarian mass, right  TECHNIQUE: Transabdominal scans were performed. Endovaginal ultrasound  was performed to better visualize the adnexa.     COMPARISON: 6/28/2023     FINDINGS:     UTERUS: 6.7 x 2.4 x 3.3 cm. Small nabothian cysts. There is a 1.2 cm  calcified intramural fibroid.     ENDOMETRIUM: 4 mm. Trace fluid in the endometrial cavity.     RIGHT OVARY: 5.2 x 2.7 x 5.0 cm. In the right ovary is a 4.5 x 2.4 x  3.9 cm anechoic structure with a well-defined back wall and increased  through transmission. There is some internal debris.     LEFT OVARY: 1.7 x 1.0 x 1.3 cm. Normal.     No significant free fluid.                                   "                                    IMPRESSION:  1.  There is a 4.5 cm predominantly simple right ovarian cyst.     2.  There is a 1.2 cm fibroid in the uterus.      Narrative & Impression   US PELVIC TRANSABDOMINAL AND TRANSVAGINAL 9/28/2023 10:45 AM     CLINICAL HISTORY: Ovarian mass, right  TECHNIQUE: Transabdominal scans were performed. Endovaginal ultrasound  was performed to better visualize the adnexa.     COMPARISON: 6/28/2023     FINDINGS:     UTERUS: 6.7 x 2.4 x 3.3 cm. Small nabothian cysts. There is a 1.2 cm  calcified intramural fibroid.     ENDOMETRIUM: 4 mm. Trace fluid in the endometrial cavity.     RIGHT OVARY: 5.2 x 2.7 x 5.0 cm. In the right ovary is a 4.5 x 2.4 x  3.9 cm anechoic structure with a well-defined back wall and increased  through transmission. There is some internal debris.     LEFT OVARY: 1.7 x 1.0 x 1.3 cm. Normal.     No significant free fluid.                                                                      IMPRESSION:  1.  There is a 4.5 cm predominantly simple right ovarian cyst.     2.  There is a 1.2 cm fibroid in the uterus.

## 2023-11-01 NOTE — PROGRESS NOTES
New Patient Hematology / Oncology Nurse Navigator Note     Referral Date: 11/01/23    Referring provider:   Diana Ortiz MD   [address] [facility] [address]   Phone: [phone]   Fax: [phone]     Referring Clinic/Organization: [facility]     Referred to: GynOnc    Requested provider (if applicable): First available - did not specify     Evaluation for : Cyst of right ovary. Per referral notes,    Surgery consultation: recommended at least [facility] BSO with her now SYMPTOMATIC right simple ovarian cyst increasing in size despite  not changing. Pt reported brain fog and difficulty with general endotracheal anesthesia.        Clinical History (per Nurse review of records provided):    Office Visit today with OBGYN (referring) -- BOOKMARKED   9/28/23 Pelvic US:  IMPRESSION:  1.  There is a 4.5 cm predominantly simple right ovarian cyst.  2.  There is a 1.2 cm fibroid in the uterus-- BOOKMARKED  10/3/23  (normal range at 6) -- BOOKMARKED    Clinical Assessment / Barriers to Care (Per Nurse):  Pt lives in [facility]     Records Location: [facility] Needed:   N/A    Additional testing needed prior to consult:   N/A    Referral updates and Plan:   OUTGOING CALL to pt:  Introduced my role as nurse navigator with [facility] dept and that we have recd the referral for dx of ovarian cyst from Dr Ortiz  Pt confirms they are aware of the referral and ready to schedule. She would prefer consultation at any location other than [place], but understands surgery would likely be in [facility].   Provided contact information if future questions arise.     -Transferred pt to NPS line [phone] to schedule appt per scheduling instructions.    Avani Escamilla, ELLENN, RN, PHN, OCN  Hematology/Oncology Nurse Navigator  [facility]  [phone]

## 2023-11-07 ENCOUNTER — PATIENT OUTREACH (OUTPATIENT)
Dept: OBGYN | Facility: CLINIC | Age: 73
End: 2023-11-07
Payer: COMMERCIAL

## 2023-11-07 NOTE — TELEPHONE ENCOUNTER
"Panel Management Review      Health Maintenance List    Health Maintenance   Topic Date Due    DEXA  Never done    ADVANCE CARE PLANNING  Never done    COLORECTAL CANCER SCREENING  Never done    HEPATITIS C SCREENING  Never done    LIPID  Never done    RSV VACCINE (Pregnancy & 60+) (1 - 1-dose 60+ series) Never done    FALL RISK ASSESSMENT  Never done    MAMMO SCREENING  08/28/2020    INFLUENZA VACCINE (1) 09/01/2023    COVID-19 Vaccine (5 - 2023-24 season) 09/01/2023    MEDICARE ANNUAL WELLNESS VISIT  06/23/2024    DTAP/TDAP/TD IMMUNIZATION (3 - Td or Tdap) 08/23/2031    PHQ-2 (once per calendar year)  Completed    Pneumococcal Vaccine: 65+ Years  Completed    ZOSTER IMMUNIZATION  Completed    IPV IMMUNIZATION  Aged Out    HPV IMMUNIZATION  Aged Out    MENINGITIS IMMUNIZATION  Aged Out    RSV MONOCLONAL ANTIBODY  Aged Out       Composite cancer screening  Chart review shows that this patient is due/due soon for the following Mammogram  No results found for: \"PAP\"  No past surgical history on file.    Is hysterectomy listed in surgical history? No   Is mastectomy listed in surgical history? No     Summary:    Patient is due/failing the following:   Mammogram    Action needed: Patient needs office visit for mammogram.    Type of outreach:  Sent [employer] message.      Staff Signature:  Naomi Ohara CMA      "

## 2023-11-07 NOTE — LETTER
November 7, 2023      Domi Nolen  [address] [address] [facility] [address]        Dear Domi,         To ensure we are providing the best quality care, we have reviewed your chart and see that you are due for:    Breast Cancer Screening:    Please call [facility]  at [phone] to schedule a Mammogram.    You may call Dept: [phone] if you have any questions. If you have completed the mammogram outside of [facility], please have the results forwarded to our office Fax # [phone]. We will update the chart for your primary Physician to review before your next annual physical.     Sincerely,        Diana Ortiz MD

## 2023-11-11 ENCOUNTER — HEALTH MAINTENANCE LETTER (OUTPATIENT)
Age: 73
End: 2023-11-11

## 2023-11-16 NOTE — TELEPHONE ENCOUNTER
RECORDS STATUS - ALL OTHER DIAGNOSIS      RECORDS RECEIVED FROM: [facility] - Internal Records   DATE RECEIVED: 11/16

## 2023-11-20 ENCOUNTER — PRE VISIT (OUTPATIENT)
Dept: ONCOLOGY | Facility: HOSPITAL | Age: 73
End: 2023-11-20
Payer: COMMERCIAL

## 2023-11-20 ENCOUNTER — PATIENT OUTREACH (OUTPATIENT)
Dept: ONCOLOGY | Facility: CLINIC | Age: 73
End: 2023-11-20

## 2023-11-20 ENCOUNTER — ONCOLOGY VISIT (OUTPATIENT)
Dept: ONCOLOGY | Facility: HOSPITAL | Age: 73
End: 2023-11-20
Attending: STUDENT IN AN ORGANIZED HEALTH CARE EDUCATION/TRAINING PROGRAM
Payer: MEDICARE

## 2023-11-20 VITALS
TEMPERATURE: 98.4 F | OXYGEN SATURATION: 97 % | SYSTOLIC BLOOD PRESSURE: 125 MMHG | HEART RATE: 70 BPM | RESPIRATION RATE: 16 BRPM | WEIGHT: 157.4 LBS | BODY MASS INDEX: 23.93 KG/M2 | DIASTOLIC BLOOD PRESSURE: 78 MMHG

## 2023-11-20 DIAGNOSIS — N83.201 CYST OF RIGHT OVARY: ICD-10-CM

## 2023-11-20 DIAGNOSIS — Z85.3 PERSONAL HISTORY OF MALIGNANT NEOPLASM OF BREAST: Primary | ICD-10-CM

## 2023-11-20 DIAGNOSIS — Z01.419 WOMEN'S ANNUAL ROUTINE GYNECOLOGICAL EXAMINATION: ICD-10-CM

## 2023-11-20 DIAGNOSIS — R10.31 RLQ ABDOMINAL PAIN: ICD-10-CM

## 2023-11-20 PROCEDURE — G0463 HOSPITAL OUTPT CLINIC VISIT: HCPCS | Mod: 25 | Performed by: OBSTETRICS & GYNECOLOGY

## 2023-11-20 PROCEDURE — G0145 SCR C/V CYTO,THINLAYER,RESCR: HCPCS | Performed by: OBSTETRICS & GYNECOLOGY

## 2023-11-20 PROCEDURE — 87624 HPV HI-RISK TYP POOLED RSLT: CPT | Performed by: OBSTETRICS & GYNECOLOGY

## 2023-11-20 PROCEDURE — 99205 OFFICE O/P NEW HI 60 MIN: CPT | Performed by: OBSTETRICS & GYNECOLOGY

## 2023-11-20 RX ORDER — MELOXICAM 15 MG/1
1 TABLET ORAL
COMMUNITY
Start: 2023-11-03

## 2023-11-20 ASSESSMENT — PAIN SCALES - GENERAL: PAINLEVEL: MILD PAIN (3)

## 2023-11-20 NOTE — NURSING NOTE
"Oncology Rooming Note    November 20, 2023 10:06 AM   Domi Nolen is a 73 year old female who presents for:    No chief complaint on file.    Initial Vitals: There were no vitals taken for this visit. Estimated body mass index is 24.15 kg/m  as calculated from the following:    Height as of 11/1/23: 1.727 m (5' 8\").    Weight as of 11/1/23: 72 kg (158 lb 12.8 oz). There is no height or weight on file to calculate BSA.  Data Unavailable Comment: Data Unavailable   No LMP recorded. Patient is postmenopausal.  Allergies reviewed: Yes  Medications reviewed: Yes    Medications: Medication refills not needed today.  Pharmacy name entered into [facility]: [facility] - [place], [place] - [address]    Clinical concerns: new finding of ovarian cyst, occasional cramping pain. No vaginal bleeding.   Dr. Zaragoza was NOT notified.      Diana Spears RN              "

## 2023-11-20 NOTE — LETTER
2023         RE: Domi Nolen  [address] [facility] [address]        Dear Colleague,    Thank you for referring your patient, Domi Nolen, to the [facility]. Please see a copy of my visit note below.                            Consult Notes on Referred Patient    Date: 2023       Dr. Diana Ortiz MD  [address] [place] [address]       RE: Domi Nolen  : 1950  JASON: 2023    Dear Dr. Diana Ortiz:    I had the pleasure of seeing your patient Domi Nolen here at the Gynecologic Cancer Clinic at the [facility] on 2023.  As you know she is a very pleasant 73 year old woman with a recent diagnosis of complex right ovarian cyst.  Given these findings she was subsequently sent to the Gynecologic Cancer Clinic for new patient consultation.   G0 breast cancer age 50 underwent a mastectomy as well as breast reconstruction and has been put on tamoxifen at that point. No postmenopausal bleeding she is eating and drinking well no nausea vomiting fever chills.  She did notice over last 6 months occasional right lower quadrant cramping.  Does not require medications for that.  She had a recent hip MRI which showed incidental finding of 3.9 cm complex right ovarian cyst, repeat ultrasound shows overall stable more simple appearing cyst has slightly increased to 4.5 cm.   6.  She of note did have an abnormal Pap smear 15 years ago with a cold.  Normal Pap smears since then.  Again no postmenopausal bleeding.  Of note she does have about 10 pounds weight loss since December which was intentional due to diet is eating and drinking well has no GI symptoms.      Past Medical History:  Breast cancer.  Cervical dysplasia.      Past Surgical History:  Appendectomy, cholecystectomy, mastectomy with breast reconstruction      Health Maintenance:  Health Maintenance Due   Topic Date Due     NAYELI  Never done     ADVANCE CARE  PLANNING  Never done     COLORECTAL CANCER SCREENING  Never done     HEPATITIS C SCREENING  Never done     LIPID  Never done     RSV VACCINE (Pregnancy & 60+) (1 - 1-dose 60+ series) Never done     MAMMO SCREENING  08/28/2020     INFLUENZA VACCINE (1) 09/01/2023     COVID-19 Vaccine (5 - 2023-24 season) 09/01/2023       History of abnormal Pap smears but 15 years ago with a colon for dysplasia.  Last Pap smear was in 2014 which was normal.  Last colonoscopy was about 3 years ago.      Current Medications:     has a current medication list which includes the following prescription(s): aspirin, atorvastatin, calcium carbonate 600 mg-vitamin d 400 units, citalopram, clonazepam, magnesium oxide, meloxicam, multi-vitamins, multiple vitamins-minerals, and pantoprazole.       Allergies:     Mirtazapine, Duloxetine, Codeine, Penicillins, and Sulfanilamide        Social History:     Social History     Tobacco Use     Smoking status: Never     Passive exposure: Current     Smokeless tobacco: Never   Substance Use Topics     Alcohol use: Yes       History   Drug Use Unknown           Family History:     Mother had kidney cancers in his 60s.  Brother had testicular cancer with 30s followed by kidney cancer.  Paternal aunt had breast cancer 70s paternal cousin who had breast cancer in her 30s and paternal grand mother also had cancer in her 40s unclear what kind.    Family History   Problem Relation Age of Onset     Cancer Mother         skin ca     Cancer Father         skin ca     Melanoma Maternal Aunt          Physical Exam:     /78   Pulse 70   Temp 98.4  F (36.9  C)   Resp 16   Wt 71.4 kg (157 lb 6.4 oz)   SpO2 97%   BMI 23.93 kg/m    Body mass index is 23.93 kg/m .    General Appearance: healthy and alert, no distress     Musculoskeletal: extremities non tender and without edema    Skin: no lesions or rashes     Neurological: normal gait, no gross defects     Psychiatric: appropriate mood and affect                                Hematological: normal cervical, supraclavicular and inguinal lymph nodes     Gastrointestinal:       abdomen soft, non-tender, non-distended, no organomegaly or masses    Genitourinary: External genitalia and urethral meatus appears normal.  Vagina is smooth without nodularity or masses.  Cervix appears normal and without lesions.  Bimanual exam reveal no masses, nodularity or fullness.  Recto-vaginal exam confirms these findings.  Pap smear was taken.      Assessment:    Domi Nolen is a 73 year old woman with a new diagnosis of right ovarian cyst.     A total of 60 minutes was spent with the patient, 40 minutes of which were spent in counseling the patient and/or treatment planning.    4.5 cm mainly simple right ovarian cyst   6  Personal history of breast cancer at 50  Histoy of cervical dysplasia 15 years ago    I discussed with the patient await the results of her Pap smear.  We will also have her see a genetic counselor given her personal history of early onset breast cancer as well as family history of breast cancer.  We did discuss that at this point the criteria removed for only visit if it significant increase in size when it is over 5 cm currently 4.5 cm.  We will repeat another ultrasound in 3 months since her last ultrasound as well as .  In the meantime if she undergoes genetic testing he does have a germline mutation this would also be an indication to remove both tubes and ovaries.  Patient would prefer not to undergo surgery unless absolutely necessary given some significant issues she had prior with anesthesia with recent hip surgery.  She agrees with this plan of surgical care all questions were answered.  She is very appreciative of her care.        Thank you for allowing us to participate in the care of your patient.         Sincerely,    Rajan Zaragoza MD, MS    Department of Obstetrics and Gynecology   Division of Gynecologic Oncology    [facility]  Phone: [phone]      Patient Care Team:  Rachel Jaime DO as PCP - General (Family Practice)  Mango Cobian MD as Assigned OBGYN Provider  Rajan Zaragoza MD as MD (Gynecologic Oncology)  MANGO COBIAN        Again, thank you for allowing me to participate in the care of your patient.        Sincerely,        Rajan Zaragoza MD

## 2023-11-20 NOTE — PROGRESS NOTES
Writer received referral to Cancer Risk Management/Genetic Counseling.    Referred for: history of breast cancer & new dx of complex right ovarian cyst     Referral reviewed for appropriate plan, and sent to New Patient Scheduling for completion.    Naomi Sheldon, RN, BSN  Oncology New Patient Nurse Navigator   [facility]  [phone]

## 2023-11-20 NOTE — PROGRESS NOTES
Consult Notes on Referred Patient    Date: 2023       Dr. Diana Ortiz MD  [address] [place] [address]       RE: Domi Nolen  : 1950  JASON: 2023    Dear Dr. Diana Ortiz:    I had the pleasure of seeing your patient Domi Nolen here at the Gynecologic Cancer Clinic at the [facility] on 2023.  As you know she is a very pleasant 73 year old woman with a recent diagnosis of complex right ovarian cyst.  Given these findings she was subsequently sent to the Gynecologic Cancer Clinic for new patient consultation.   G0 breast cancer age 50 underwent a mastectomy as well as breast reconstruction and has been put on tamoxifen at that point. No postmenopausal bleeding she is eating and drinking well no nausea vomiting fever chills.  She did notice over last 6 months occasional right lower quadrant cramping.  Does not require medications for that.  She had a recent hip MRI which showed incidental finding of 3.9 cm complex right ovarian cyst, repeat ultrasound shows overall stable more simple appearing cyst has slightly increased to 4.5 cm.   6.  She of note did have an abnormal Pap smear 15 years ago with a cold.  Normal Pap smears since then.  Again no postmenopausal bleeding.  Of note she does have about 10 pounds weight loss since December which was intentional due to diet is eating and drinking well has no GI symptoms.      Past Medical History:  Breast cancer.  Cervical dysplasia.      Past Surgical History:  Appendectomy, cholecystectomy, mastectomy with breast reconstruction      Health Maintenance:  Health Maintenance Due   Topic Date Due    DEXA  Never done    ADVANCE CARE PLANNING  Never done    COLORECTAL CANCER SCREENING  Never done    HEPATITIS C SCREENING  Never done    LIPID  Never done    RSV VACCINE (Pregnancy & 60+) (1 - 1-dose 60+ series) Never done    MAMMO SCREENING  2020    INFLUENZA VACCINE (1) 2023     COVID-19 Vaccine (5 - 2023-24 season) 09/01/2023       History of abnormal Pap smears but 15 years ago with a colon for dysplasia.  Last Pap smear was in 2014 which was normal.  Last colonoscopy was about 3 years ago.      Current Medications:     has a current medication list which includes the following prescription(s): aspirin, atorvastatin, calcium carbonate 600 mg-vitamin d 400 units, citalopram, clonazepam, magnesium oxide, meloxicam, multi-vitamins, multiple vitamins-minerals, and pantoprazole.       Allergies:     Mirtazapine, Duloxetine, Codeine, Penicillins, and Sulfanilamide        Social History:     Social History     Tobacco Use    Smoking status: Never     Passive exposure: Current    Smokeless tobacco: Never   Substance Use Topics    Alcohol use: Yes       History   Drug Use Unknown           Family History:     Mother had kidney cancers in his 60s.  Brother had testicular cancer with 30s followed by kidney cancer.  Paternal aunt had breast cancer 70s paternal cousin who had breast cancer in her 30s and paternal grand mother also had cancer in her 40s unclear what kind.    Family History   Problem Relation Age of Onset    Cancer Mother         skin ca    Cancer Father         skin ca    Melanoma Maternal Aunt          Physical Exam:     /78   Pulse 70   Temp 98.4  F (36.9  C)   Resp 16   Wt 71.4 kg (157 lb 6.4 oz)   SpO2 97%   BMI 23.93 kg/m    Body mass index is 23.93 kg/m .    General Appearance: healthy and alert, no distress     Musculoskeletal: extremities non tender and without edema    Skin: no lesions or rashes     Neurological: normal gait, no gross defects     Psychiatric: appropriate mood and affect                               Hematological: normal cervical, supraclavicular and inguinal lymph nodes     Gastrointestinal:       abdomen soft, non-tender, non-distended, no organomegaly or masses    Genitourinary: External genitalia and urethral meatus appears normal.  Vagina is  smooth without nodularity or masses.  Cervix appears normal and without lesions.  Bimanual exam reveal no masses, nodularity or fullness.  Recto-vaginal exam confirms these findings.  Pap smear was taken.      Assessment:    Domi Nolen is a 73 year old woman with a new diagnosis of right ovarian cyst.     A total of 60 minutes was spent with the patient, 40 minutes of which were spent in counseling the patient and/or treatment planning.    4.5 cm mainly simple right ovarian cyst   6  Personal history of breast cancer at 50  Histoy of cervical dysplasia 15 years ago    I discussed with the patient await the results of her Pap smear.  We will also have her see a genetic counselor given her personal history of early onset breast cancer as well as family history of breast cancer.  We did discuss that at this point the criteria removed for only visit if it significant increase in size when it is over 5 cm currently 4.5 cm.  We will repeat another ultrasound in 3 months since her last ultrasound as well as .  In the meantime if she undergoes genetic testing he does have a germline mutation this would also be an indication to remove both tubes and ovaries.  Patient would prefer not to undergo surgery unless absolutely necessary given some significant issues she had prior with anesthesia with recent hip surgery.  She agrees with this plan of surgical care all questions were answered.  She is very appreciative of her care.        Thank you for allowing us to participate in the care of your patient.         Sincerely,    Rajan Zaragoza MD, MS    Department of Obstetrics and Gynecology   Division of Gynecologic Oncology   [facility]  Phone: [phone]    CC  Patient Care Team:  Rachel Jaime DO as PCP - General (Family Practice)  Mango Cobian MD as Assigned OBGYN Provider  Rajan Zaragoza MD as MD (Gynecologic Oncology)  MANGO COBIAN

## 2023-11-20 NOTE — PATIENT INSTRUCTIONS
Genetic Counseling as soon as we can     End of December/Early January   US and visit with Dr Zaragoza

## 2023-11-22 ENCOUNTER — TELEPHONE (OUTPATIENT)
Dept: OBGYN | Facility: CLINIC | Age: 73
End: 2023-11-22
Payer: COMMERCIAL

## 2023-11-22 LAB
BKR LAB AP GYN ADEQUACY: NORMAL
BKR LAB AP GYN INTERPRETATION: NORMAL
BKR LAB AP HPV REFLEX: NORMAL
BKR LAB AP PREVIOUS ABNORMAL: NORMAL
PATH REPORT.COMMENTS IMP SPEC: NORMAL
PATH REPORT.COMMENTS IMP SPEC: NORMAL
PATH REPORT.RELEVANT HX SPEC: NORMAL

## 2023-11-22 NOTE — LETTER
2023      Domi Nolen  [address] [facility] [address]        Dear Domi,       To ensure we are providing the best quality care, we have reviewed your chart and see that you are due for:    Colon Cancer Screening:    If you have received a referral to schedule a Colonoscopy or choose to do a Colonoscopy instead of the FIT/ Cologuard test, please call [phone] to schedule.    If you have received a FIT test kit from a prior office visit, please check the expiration date. If , please get a new kit from the Lab/ Clinic. If not , please complete the test and mail in as soon as you are able.    If you would prefer to complete a Cologuard test, please reach out to your provider to see if this is an option for you.    You may call Dept: [phone] if you have any questions. If you have completed the tests outside of [facility], please have the results forwarded to our office Fax # [phone]. We will update the chart for your primary Physician to review before your next annual physical.     Sincerely,        Diana Ortiz MD

## 2023-11-22 NOTE — TELEPHONE ENCOUNTER
"Panel Management Review  Health Maintenance List    Health Maintenance   Topic Date Due    DEXA  Never done    ADVANCE CARE PLANNING  Never done    COLORECTAL CANCER SCREENING  Never done    HEPATITIS C SCREENING  Never done    LIPID  Never done    RSV VACCINE (Pregnancy & 60+) (1 - 1-dose 60+ series) Never done    MAMMO SCREENING  08/28/2020    INFLUENZA VACCINE (1) 09/01/2023    COVID-19 Vaccine (5 - 2023-24 season) 09/01/2023    MEDICARE ANNUAL WELLNESS VISIT  06/23/2024    FALL RISK ASSESSMENT  11/20/2024    DTAP/TDAP/TD IMMUNIZATION (3 - Td or Tdap) 08/23/2031    PHQ-2 (once per calendar year)  Completed    Pneumococcal Vaccine: 65+ Years  Completed    ZOSTER IMMUNIZATION  Completed    IPV IMMUNIZATION  Aged Out    HPV IMMUNIZATION  Aged Out    MENINGITIS IMMUNIZATION  Aged Out    RSV MONOCLONAL ANTIBODY  Aged Out       Composite cancer screening  Chart review shows that this patient is due/due soon for the following Colonoscopy  No results found for: \"PAP\"  No past surgical history on file.    Is hysterectomy listed in surgical history? No   Is mastectomy listed in surgical history? No     Summary:    Patient is due/failing the following:   Colonoscopy    Action needed: Patient needs office visit for colonoscopy.    Type of outreach:  Sent Snapflow message.      Staff Signature:  Naomi Ohara CMA        "

## 2023-11-24 LAB
HUMAN PAPILLOMA VIRUS 16 DNA: NEGATIVE
HUMAN PAPILLOMA VIRUS 18 DNA: NEGATIVE
HUMAN PAPILLOMA VIRUS FINAL DIAGNOSIS: NORMAL
HUMAN PAPILLOMA VIRUS OTHER HR: NEGATIVE

## 2024-01-02 ENCOUNTER — HOSPITAL ENCOUNTER (OUTPATIENT)
Dept: ULTRASOUND IMAGING | Facility: HOSPITAL | Age: 74
Discharge: HOME | End: 2024-01-02
Attending: OBSTETRICS & GYNECOLOGY | Admitting: OBSTETRICS & GYNECOLOGY
Payer: MEDICARE

## 2024-01-02 DIAGNOSIS — N83.201 CYST OF RIGHT OVARY: ICD-10-CM

## 2024-01-02 PROCEDURE — 76856 US EXAM PELVIC COMPLETE: CPT

## 2024-01-10 ENCOUNTER — ONCOLOGY VISIT (OUTPATIENT)
Dept: ONCOLOGY | Facility: HOSPITAL | Age: 74
End: 2024-01-10
Attending: OBSTETRICS & GYNECOLOGY
Payer: COMMERCIAL

## 2024-01-10 VITALS
WEIGHT: 157 LBS | HEART RATE: 65 BPM | RESPIRATION RATE: 16 BRPM | OXYGEN SATURATION: 98 % | DIASTOLIC BLOOD PRESSURE: 70 MMHG | BODY MASS INDEX: 23.87 KG/M2 | SYSTOLIC BLOOD PRESSURE: 126 MMHG

## 2024-01-10 DIAGNOSIS — N83.201 CYST OF RIGHT OVARY: Primary | ICD-10-CM

## 2024-01-10 DIAGNOSIS — R19.09 OTHER INTRA-ABDOMINAL AND PELVIC SWELLING, MASS AND LUMP: ICD-10-CM

## 2024-01-10 PROCEDURE — 99214 OFFICE O/P EST MOD 30 MIN: CPT | Performed by: OBSTETRICS & GYNECOLOGY

## 2024-01-10 PROCEDURE — G0463 HOSPITAL OUTPT CLINIC VISIT: HCPCS | Performed by: OBSTETRICS & GYNECOLOGY

## 2024-01-10 ASSESSMENT — PAIN SCALES - GENERAL: PAINLEVEL: NO PAIN (0)

## 2024-01-10 NOTE — LETTER
1/10/2024         RE: Domi Nolen  [address] [facility] [address]        Dear Colleague,    Thank you for referring your patient, Domi Nolen, to the [facility]. Please see a copy of my visit note below.                Follow Up Notes on Referred Patient    Date: 1/10/2024       Dr. Shah referring provider defined for this encounter.       RE: Domi Nolen  : 1950  JASON: 1/10/2024    Dear Dr. Diana Ortiz:     73 year old woman with a recent diagnosis of complex right ovarian cyst.  Given these findings she was subsequently sent to the Gynecologic Cancer Clinic for new patient consultation.   G0 breast cancer age 50 underwent a mastectomy as well as breast reconstruction and has been put on tamoxifen at that point. No postmenopausal bleeding she is eating and drinking well no nausea vomiting fever chills.  She did notice over last 6 months occasional right lower quadrant cramping.  Does not require medications for that.  She had a recent hip MRI which showed incidental finding of 3.9 cm complex right ovarian cyst, repeat ultrasound shows overall stable more simple appearing cyst has slightly increased to 4.5 cm.   6.  She of note did have an abnormal Pap smear 15 years ago, normal Pap smears since then.  Again no postmenopausal bleeding.  Of note she does have about 10 pounds weight loss since December which was intentional due to diet is eating and drinking well has no GI symptoms.    Past Medical History:  Breast cancer.  Cervical dysplasia.        Past Surgical History:  Appendectomy, cholecystectomy, mastectomy with breast reconstruction      Health Maintenance Due   Topic Date Due     DEXA  Never done     ADVANCE CARE PLANNING  Never done     COLORECTAL CANCER SCREENING  Never done     HEPATITIS C SCREENING  Never done     LIPID  Never done     RSV VACCINE (Pregnancy & 60+) (1 - 1-dose 60+ series) Never done     INFLUENZA VACCINE (1) 2023      COVID-19 Vaccine (5 - 2023-24 season) 09/01/2023     PHQ-2 (once per calendar year)  01/01/2024       Current Medications:     Current Outpatient Medications   Medication Sig Dispense Refill     aspirin 81 MG EC tablet Take 81 mg by mouth       atorvastatin (LIPITOR) 20 MG tablet        calcium carbonate 600 mg-vitamin D 400 units (CALTRATE) 600-400 MG-UNIT per tablet Take 1 tablet by mouth       citalopram (CELEXA) 40 MG tablet Take 40 mg by mouth       clonazePAM (KLONOPIN) 0.5 MG tablet        magnesium oxide (MAG-OX) 400 MG tablet Take 400 mg by mouth       Multiple Vitamin (MULTI-VITAMINS) TABS Take 1 tablet by mouth       Multiple Vitamins-Minerals (PRESERVISION AREDS PO) Take 2 capsules by mouth 2 times daily       pantoprazole (PROTONIX) 40 MG EC tablet Take 40 mg by mouth       meloxicam (MOBIC) 15 MG tablet Take 1 tablet by mouth daily at 2 pm (Patient not taking: Reported on 1/10/2024)           Allergies:        Allergies   Allergen Reactions     Mirtazapine Other (See Comments)     Anger, homicidal     Duloxetine Anxiety     More anxiety, worse sleep.     Codeine Other (See Comments)     Abdominal Pain     Penicillins Other (See Comments)     Comment: Hives, Description:      Sulfanilamide Other (See Comments)     Comment: Rash, Description:         Social History:     Social History     Tobacco Use     Smoking status: Never     Passive exposure: Current     Smokeless tobacco: Never   Substance Use Topics     Alcohol use: Yes       History   Drug Use Unknown         Family History:       Family History   Problem Relation Age of Onset     Cancer Mother         skin ca     Cancer Father         skin ca     Melanoma Maternal Aunt          Physical Exam:     /70   Pulse 65   Resp 16   Wt 71.2 kg (157 lb)   SpO2 98%   BMI 23.87 kg/m    Body mass index is 23.87 kg/m .    General Appearance: healthy and alert, no distress    Neurological: normal gait, no gross defects     Psychiatric: appropriate  mood and affect                                   Assessment:    Domi Nolen is a 73 year old woman with a new diagnosis of right ovarian cyst.      A total of 31 minutes was spent with the patient, chart review, documentation, orders and counseling.     4.2 cm mainly simple right ovarian cyst   6  Personal history of breast cancer at 50  Histoy of cervical dysplasia 15 years ago, normal Pap smear HPV-     I discussed with the patient await the results of her Pap smear, was normal and HPV-.  We will also have her see a genetic counselor given her personal history of early onset breast cancer as well as family history of breast cancer.  We did discuss that at this point the criteria removed for only visit if it significant increase in size when it is over 5 cm currently decreased from 4.5 to 4.2cm.  We will repeat another ultrasound in 34 months since her last ultrasound as well as .  In the meantime if she undergoes genetic testing he does have a germline mutation this would also be an indication to remove both tubes and ovaries.  Patient would prefer not to undergo surgery unless absolutely necessary given some significant issues she had prior with anesthesia with recent hip surgery.  She agrees with this plan of surgical care all questions were answered.  She is very appreciative of her care.           Thank you for allowing us to participate in the care of your patient.           Sincerely,     Rajan Zaragoza MD, MS    Department of Obstetrics and Gynecology   Division of Gynecologic Oncology   [facility]  Phone: [phone]    CC  Patient Care Team:  Rachel Jaime DO as PCP - General (Family Practice)  Diana Ortiz MD as Assigned OBGYN Provider  Rajan Zaragoza MD as MD (Gynecologic Oncology)  Rajan Zaragoza MD as Assigned Cancer Care Provider        Again, thank you for allowing me to participate in the care of your patient.         Sincerely,        Rajan Zaragoza MD

## 2024-01-10 NOTE — PROGRESS NOTES
Follow Up Notes on Referred Patient    Date: 1/10/2024       Dr. Shah referring provider defined for this encounter.       RE: Domi Nolen  : 1950  JASON: 1/10/2024    Dear Dr. Diana Ortiz:     73 year old woman with a recent diagnosis of complex right ovarian cyst.  Given these findings she was subsequently sent to the [facility] for new patient consultation.   G0 breast cancer age 50 underwent a mastectomy as well as breast reconstruction and has been put on tamoxifen at that point. No postmenopausal bleeding she is eating and drinking well no nausea vomiting fever chills.  She did notice over last 6 months occasional right lower quadrant cramping.  Does not require medications for that.  She had a recent hip MRI which showed incidental finding of 3.9 cm complex right ovarian cyst, repeat ultrasound shows overall stable more simple appearing cyst has slightly increased to 4.5 cm.   6.  She of note did have an abnormal Pap smear 15 years ago, normal Pap smears since then.  Again no postmenopausal bleeding.  Of note she does have about 10 pounds weight loss since December which was intentional due to diet is eating and drinking well has no GI symptoms.    Past Medical History:  Breast cancer.  Cervical dysplasia.        Past Surgical History:  Appendectomy, cholecystectomy, mastectomy with breast reconstruction      Health Maintenance Due   Topic Date Due    DEXA  Never done    ADVANCE CARE PLANNING  Never done    COLORECTAL CANCER SCREENING  Never done    HEPATITIS C SCREENING  Never done    LIPID  Never done    RSV VACCINE (Pregnancy & 60+) (1 - 1-dose 60+ series) Never done    INFLUENZA VACCINE (1) 2023    COVID-19 Vaccine (2023- season) 2023    PHQ-2 (once per calendar year)  2024       Current Medications:     Current Outpatient Medications   Medication Sig Dispense Refill    aspirin 81 MG EC tablet Take 81 mg by mouth      atorvastatin (LIPITOR) 20  MG tablet       calcium carbonate 600 mg-vitamin D 400 units (CALTRATE) 600-400 MG-UNIT per tablet Take 1 tablet by mouth      citalopram (CELEXA) 40 MG tablet Take 40 mg by mouth      clonazePAM (KLONOPIN) 0.5 MG tablet       magnesium oxide (MAG-OX) 400 MG tablet Take 400 mg by mouth      Multiple Vitamin (MULTI-VITAMINS) TABS Take 1 tablet by mouth      Multiple Vitamins-Minerals (PRESERVISION AREDS PO) Take 2 capsules by mouth 2 times daily      pantoprazole (PROTONIX) 40 MG EC tablet Take 40 mg by mouth      meloxicam (MOBIC) 15 MG tablet Take 1 tablet by mouth daily at 2 pm (Patient not taking: Reported on 1/10/2024)           Allergies:        Allergies   Allergen Reactions    Mirtazapine Other (See Comments)     Anger, homicidal    Duloxetine Anxiety     More anxiety, worse sleep.    Codeine Other (See Comments)     Abdominal Pain    Penicillins Other (See Comments)     Comment: Hives, Description:     Sulfanilamide Other (See Comments)     Comment: Rash, Description:         Social History:     Social History     Tobacco Use    Smoking status: Never     Passive exposure: Current    Smokeless tobacco: Never   Substance Use Topics    Alcohol use: Yes       History   Drug Use Unknown         Family History:       Family History   Problem Relation Age of Onset    Cancer Mother         skin ca    Cancer Father         skin ca    Melanoma Maternal Aunt          Physical Exam:     /70   Pulse 65   Resp 16   Wt 71.2 kg (157 lb)   SpO2 98%   BMI 23.87 kg/m    Body mass index is 23.87 kg/m .    General Appearance: healthy and alert, no distress    Neurological: normal gait, no gross defects     Psychiatric: appropriate mood and affect                                   Assessment:    Domi Nolen is a 73 year old woman with a new diagnosis of right ovarian cyst.      A total of 31 minutes was spent with the patient, chart review, documentation, orders and counseling.     4.2 cm mainly simple right ovarian  cyst   6  Personal history of breast cancer at 50  Histoy of cervical dysplasia 15 years ago, normal Pap smear HPV-     I discussed with the patient await the results of her Pap smear, was normal and HPV-.  We will also have her see a genetic counselor given her personal history of early onset breast cancer as well as family history of breast cancer.  We did discuss that at this point the criteria removed for only visit if it significant increase in size when it is over 5 cm currently decreased from 4.5 to 4.2cm.  We will repeat another ultrasound in 3-4 months since her last ultrasound as well as .  In the meantime if she undergoes genetic testing he does have a germline mutation this would also be an indication to remove both tubes and ovaries.  Patient would prefer not to undergo surgery unless absolutely necessary given some significant issues she had prior with anesthesia with recent hip surgery.  She agrees with this plan of surgical care all questions were answered.  She is very appreciative of her care.           Thank you for allowing us to participate in the care of your patient.           Sincerely,     Rajan Zaragoza MD, MS    Department of Obstetrics and Gynecology   Division of Gynecologic Oncology   [facility]  Phone: [phone]    CC  Patient Care Team:  Rachel Jaime DO as PCP - General (Family Practice)  Diana Ortiz MD as Assigned OBGYN Provider  Rajan Zaragoza MD as MD (Gynecologic Oncology)  Rajan Zaragoza MD as Assigned Cancer Care Provider

## 2024-01-10 NOTE — NURSING NOTE
"Oncology Rooming Note    January 10, 2024 10:44 AM   Domi Nolen is a 73 year old female who presents for:    Chief Complaint   Patient presents with    Oncology Clinic Visit     Gyn Onc follow up     Initial Vitals: There were no vitals taken for this visit. Estimated body mass index is 23.93 kg/m  as calculated from the following:    Height as of 11/1/23: 1.727 m (5' 8\").    Weight as of 11/20/23: 71.4 kg (157 lb 6.4 oz). There is no height or weight on file to calculate BSA.  Data Unavailable Comment: Data Unavailable   No LMP recorded. Patient is postmenopausal.  Allergies reviewed: Yes  Medications reviewed: Yes    Medications: Medication refills not needed today.  Pharmacy name entered into [facility]: [facility] - [place], [place] - [facility] [address]    Frailty Screening:   Is the patient here for a new oncology consult visit in cancer care? 2. No      Clinical concerns: Feeling well, denies cramping or bleeding/discharge at this time.   Dr. Zaragoza was NOT notified.      Diana Spears RN              "

## 2024-05-10 ENCOUNTER — HOSPITAL ENCOUNTER (OUTPATIENT)
Dept: ULTRASOUND IMAGING | Facility: CLINIC | Age: 74
Discharge: HOME | End: 2024-05-10
Attending: OBSTETRICS & GYNECOLOGY | Admitting: OBSTETRICS & GYNECOLOGY
Payer: MEDICARE

## 2024-05-10 DIAGNOSIS — N83.201 CYST OF RIGHT OVARY: ICD-10-CM

## 2024-05-10 PROCEDURE — 76830 TRANSVAGINAL US NON-OB: CPT

## 2024-05-10 PROCEDURE — 76856 US EXAM PELVIC COMPLETE: CPT

## 2024-05-15 ENCOUNTER — ONCOLOGY VISIT (OUTPATIENT)
Dept: ONCOLOGY | Facility: HOSPITAL | Age: 74
End: 2024-05-15
Attending: OBSTETRICS & GYNECOLOGY
Payer: COMMERCIAL

## 2024-05-15 VITALS
HEART RATE: 60 BPM | DIASTOLIC BLOOD PRESSURE: 80 MMHG | OXYGEN SATURATION: 98 % | SYSTOLIC BLOOD PRESSURE: 143 MMHG | BODY MASS INDEX: 23.9 KG/M2 | TEMPERATURE: 98 F | RESPIRATION RATE: 16 BRPM | WEIGHT: 157.2 LBS

## 2024-05-15 DIAGNOSIS — R19.09 OTHER INTRA-ABDOMINAL AND PELVIC SWELLING, MASS AND LUMP: ICD-10-CM

## 2024-05-15 DIAGNOSIS — N83.201 CYST OF RIGHT OVARY: Primary | ICD-10-CM

## 2024-05-15 PROCEDURE — 99214 OFFICE O/P EST MOD 30 MIN: CPT | Performed by: OBSTETRICS & GYNECOLOGY

## 2024-05-15 PROCEDURE — G0463 HOSPITAL OUTPT CLINIC VISIT: HCPCS | Performed by: OBSTETRICS & GYNECOLOGY

## 2024-05-15 RX ORDER — TOLTERODINE 4 MG/1
4 CAPSULE, EXTENDED RELEASE ORAL DAILY
COMMUNITY

## 2024-05-15 ASSESSMENT — PAIN SCALES - GENERAL: PAINLEVEL: NO PAIN (0)

## 2024-05-15 NOTE — LETTER
5/15/2024         RE: Domi Nolen  [id number] [facility] [address]        Dear Colleague,    Thank you for referring your patient, Domi Nolen, to the [facility]. Please see a copy of my visit note below.                Follow Up Notes on Referred Patient    Date: 5/15/2024       Dr. Rajan Zaragoza MD  [address] [place] [id number]       RE: Domi Nolen  : 1950  JASON: 5/15/2024    73 year old woman with a recent diagnosis of complex right ovarian cyst.   G0 breast cancer age 50 underwent a mastectomy as well as breast reconstruction and has been put on tamoxifen at that point. No postmenopausal bleeding she is eating and drinking well no nausea vomiting fever chills.  She did notice over last 6 months occasional right lower quadrant cramping.  Does not require medications for that.  She had a recent hip MRI which showed incidental finding of 3.9 cm complex right ovarian cyst, repeat ultrasound shows overall stable more simple appearing cyst has slightly increased to 4.5 cm.   6.  She of note did have an abnormal Pap smear 15 years ago, normal Pap smears since then.  Again no postmenopausal bleeding.  Of note she does have about 10 pounds weight loss since December which was intentional due to diet is eating and drinking well has no GI symptoms.         Past Medical History:  Breast cancer.  Cervical dysplasia.        Past Surgical History:  Appendectomy, cholecystectomy, mastectomy with breast reconstruction         Health Maintenance Due   Topic Date Due     DEXA  Never done     LIPID  Never done     ADVANCE CARE PLANNING  Never done     GLUCOSE  Never done     HEPATITIS C SCREENING  Never done     RSV VACCINE (Pregnancy & 60+) (1 - 1-dose 60+ series) Never done     COVID-19 Vaccine (2023-24 season) 2023     PHQ-2 (once per calendar year)  2024       Current Medications:     Current Outpatient Medications   Medication  Sig Dispense Refill     aspirin 81 MG EC tablet Take 81 mg by mouth       atorvastatin (LIPITOR) 20 MG tablet        calcium carbonate 600 mg-vitamin D 400 units (CALTRATE) 600-400 MG-UNIT per tablet Take 1 tablet by mouth       citalopram (CELEXA) 40 MG tablet Take 40 mg by mouth       clonazePAM (KLONOPIN) 0.5 MG tablet        magnesium oxide (MAG-OX) 400 MG tablet Take 400 mg by mouth       Multiple Vitamin (MULTI-VITAMINS) TABS Take 1 tablet by mouth       Multiple Vitamins-Minerals (PRESERVISION AREDS PO) Take 2 capsules by mouth 2 times daily       pantoprazole (PROTONIX) 40 MG EC tablet Take 40 mg by mouth       tolterodine ER (DETROL LA) 4 MG 24 hr capsule Take 4 mg by mouth daily       meloxicam (MOBIC) 15 MG tablet Take 1 tablet by mouth daily at 2 pm (Patient not taking: Reported on 1/10/2024)           Allergies:        Allergies   Allergen Reactions     Mirtazapine Other (See Comments)     Anger, homicidal     Duloxetine Anxiety     More anxiety, worse sleep.     Codeine Other (See Comments)     Abdominal Pain     Penicillins Other (See Comments)     Comment: Hives, Description:      Sulfanilamide Other (See Comments)     Comment: Rash, Description:         Social History:     Social History     Tobacco Use     Smoking status: Never     Passive exposure: Current     Smokeless tobacco: Never   Substance Use Topics     Alcohol use: Yes       History   Drug Use Unknown         Family History:       Family History   Problem Relation Age of Onset     Cancer Mother         skin ca     Cancer Father         skin ca     Melanoma Maternal Aunt          Physical Exam:     BP (!) 143/80   Pulse 60   Temp 98  F (36.7  C)   Resp 16   Wt 71.3 kg (157 lb 3.2 oz)   SpO2 98%   BMI 23.90 kg/m    Body mass index is 23.9 kg/m .    General Appearance: healthy and alert, no distress     Musculoskeletal: extremities non tender and without edema    Neurological: normal gait, no gross defects     Psychiatric: appropriate  mood and affect              Gastrointestinal:       abdomen soft, non-tender, non-distended, no organomegaly or masses        Assessment:    Domi Nolen is a 73 year old woman with a new diagnosis of right ovarian cyst.      A total of 31 minutes was spent with the patient, chart review, documentation, orders and counseling.     4.2 cm mainly simple right ovarian cyst, now 2.8cm   6  Personal history of breast cancer at 50  Histoy of cervical dysplasia 15 years ago, normal Pap smear HPV-     I discussed with the patient on the recent ultrasound the cyst has decreased to 2.8 cm.  We will plan to do a follow-up ultrasound in 6 months.  We again discussed indications for removal based on size or increasing size.  Patient would prefer not to undergo surgery unless absolutely necessary given some significant issues she had prior with anesthesia with recent hip surgery.  She agrees with this plan of surgical care all questions were answered.  She is very appreciative of her care.          Ruth Vega MD, MS    Department of Obstetrics and Gynecology   Division of Gynecologic Oncology   [facility]  Phone: [phone]    CC  Patient Care Team:  Rachel Jaime DO as PCP - General (Family Practice)  Diana Ortiz MD as Assigned OBGYN Provider  Ruth Vega MD as MD (Gynecologic Oncology)  Ruth Vega MD as Assigned Cancer Care Provider  RUTH VEGA      Again, thank you for allowing me to participate in the care of your patient.        Sincerely,        Ruth Vega MD

## 2024-05-15 NOTE — PROGRESS NOTES
Follow Up Notes on Referred Patient    Date: 5/15/2024       Dr. Rajan Zaragoza MD  [address] [place] [address]       RE: Domi Nolen  : 1950  JASON: 5/15/2024    73 year old woman with a recent diagnosis of complex right ovarian cyst.   G0 breast cancer age 50 underwent a mastectomy as well as breast reconstruction and has been put on tamoxifen at that point. No postmenopausal bleeding she is eating and drinking well no nausea vomiting fever chills.  She did notice over last 6 months occasional right lower quadrant cramping.  Does not require medications for that.  She had a recent hip MRI which showed incidental finding of 3.9 cm complex right ovarian cyst, repeat ultrasound shows overall stable more simple appearing cyst has slightly increased to 4.5 cm.   6.  She of note did have an abnormal Pap smear 15 years ago, normal Pap smears since then.  Again no postmenopausal bleeding.  Of note she does have about 10 pounds weight loss since December which was intentional due to diet is eating and drinking well has no GI symptoms.         Past Medical History:  Breast cancer.  Cervical dysplasia.        Past Surgical History:  Appendectomy, cholecystectomy, mastectomy with breast reconstruction         Health Maintenance Due   Topic Date Due    DEXA  Never done    LIPID  Never done    ADVANCE CARE PLANNING  Never done    GLUCOSE  Never done    HEPATITIS C SCREENING  Never done    RSV VACCINE (Pregnancy & 60+) (1 - 1-dose 60+ series) Never done    COVID-19 Vaccine (2023- season) 2023    PHQ-2 (once per calendar year)  2024       Current Medications:     Current Outpatient Medications   Medication Sig Dispense Refill    aspirin 81 MG EC tablet Take 81 mg by mouth      atorvastatin (LIPITOR) 20 MG tablet       calcium carbonate 600 mg-vitamin D 400 units (CALTRATE) 600-400 MG-UNIT per tablet Take 1 tablet by mouth      citalopram (CELEXA) 40 MG tablet Take 40  mg by mouth      clonazePAM (KLONOPIN) 0.5 MG tablet       magnesium oxide (MAG-OX) 400 MG tablet Take 400 mg by mouth      Multiple Vitamin (MULTI-VITAMINS) TABS Take 1 tablet by mouth      Multiple Vitamins-Minerals (PRESERVISION AREDS PO) Take 2 capsules by mouth 2 times daily      pantoprazole (PROTONIX) 40 MG EC tablet Take 40 mg by mouth      tolterodine ER (DETROL LA) 4 MG 24 hr capsule Take 4 mg by mouth daily      meloxicam (MOBIC) 15 MG tablet Take 1 tablet by mouth daily at 2 pm (Patient not taking: Reported on 1/10/2024)           Allergies:        Allergies   Allergen Reactions    Mirtazapine Other (See Comments)     Anger, homicidal    Duloxetine Anxiety     More anxiety, worse sleep.    Codeine Other (See Comments)     Abdominal Pain    Penicillins Other (See Comments)     Comment: Hives, Description:     Sulfanilamide Other (See Comments)     Comment: Rash, Description:         Social History:     Social History     Tobacco Use    Smoking status: Never     Passive exposure: Current    Smokeless tobacco: Never   Substance Use Topics    Alcohol use: Yes       History   Drug Use Unknown         Family History:       Family History   Problem Relation Age of Onset    Cancer Mother         skin ca    Cancer Father         skin ca    Melanoma Maternal Aunt          Physical Exam:     BP (!) 143/80   Pulse 60   Temp 98  F (36.7  C)   Resp 16   Wt 71.3 kg (157 lb 3.2 oz)   SpO2 98%   BMI 23.90 kg/m    Body mass index is 23.9 kg/m .    General Appearance: healthy and alert, no distress     Musculoskeletal: extremities non tender and without edema    Neurological: normal gait, no gross defects     Psychiatric: appropriate mood and affect              Gastrointestinal:       abdomen soft, non-tender, non-distended, no organomegaly or masses        Assessment:    Domi Nolen is a 73 year old woman with a new diagnosis of right ovarian cyst.      A total of 31 minutes was spent with the patient, chart  review, documentation, orders and counseling.     4.2 cm mainly simple right ovarian cyst, now 2.8cm   6  Personal history of breast cancer at 50  Histoy of cervical dysplasia 15 years ago, normal Pap smear HPV-     I discussed with the patient on the recent ultrasound the cyst has decreased to 2.8 cm.  We will plan to do a follow-up ultrasound in 6 months.  We again discussed indications for removal based on size or increasing size.  Patient would prefer not to undergo surgery unless absolutely necessary given some significant issues she had prior with anesthesia with recent hip surgery.  She agrees with this plan of surgical care all questions were answered.  She is very appreciative of her care.          Ruth Zaragoza MD, MS    Department of Obstetrics and Gynecology   Division of Gynecologic Oncology   [facility]  Phone: [phone]    CC  Patient Care Team:  Rachel Jaime DO as PCP - General (Family Practice)  Diana Ortiz MD as Assigned OBGYN Provider  Ruth Zaragoza MD as MD (Gynecologic Oncology)  Ruth Zaragoza MD as Assigned Cancer Care Provider  RUTH ZARAGOZA

## 2024-05-15 NOTE — NURSING NOTE
"Oncology Rooming Note    May 15, 2024 8:40 AM   Domi Nolen is a 73 year old female who presents for:    Chief Complaint   Patient presents with    Oncology Clinic Visit     Gyn Onc follow up     Initial Vitals: BP (!) 143/80   Pulse 60   Temp 98  F (36.7  C)   Resp 16   Wt 71.3 kg (157 lb 3.2 oz)   SpO2 98%   BMI 23.90 kg/m   Estimated body mass index is 23.9 kg/m  as calculated from the following:    Height as of 11/1/23: 1.727 m (5' 8\").    Weight as of this encounter: 71.3 kg (157 lb 3.2 oz). Body surface area is 1.85 meters squared.  No Pain (0) Comment: Data Unavailable   No LMP recorded. Patient is postmenopausal.  Allergies reviewed: Yes  Medications reviewed: Yes    Medications: Medication refills not needed today.  Pharmacy name entered into [facility]: [place], [place] - [address]    Frailty Screening:   Is the patient here for a new oncology consult visit in cancer care? 2. No      Clinical concerns: urinary frequency and stress incont. Mild  Dr. Zaragoza was NOT notified.      Diana Spears RN              "

## 2024-08-17 ENCOUNTER — HEALTH MAINTENANCE LETTER (OUTPATIENT)
Age: 74
End: 2024-08-17

## 2024-11-15 ENCOUNTER — HOSPITAL ENCOUNTER (OUTPATIENT)
Dept: ULTRASOUND IMAGING | Facility: CLINIC | Age: 74
Discharge: HOME | End: 2024-11-15
Attending: OBSTETRICS & GYNECOLOGY | Admitting: OBSTETRICS & GYNECOLOGY
Payer: MEDICARE

## 2024-11-15 DIAGNOSIS — N83.201 CYST OF RIGHT OVARY: ICD-10-CM

## 2024-11-15 PROCEDURE — 76856 US EXAM PELVIC COMPLETE: CPT

## 2024-11-27 ENCOUNTER — ONCOLOGY VISIT (OUTPATIENT)
Dept: ONCOLOGY | Facility: HOSPITAL | Age: 74
End: 2024-11-27
Attending: OBSTETRICS & GYNECOLOGY
Payer: MEDICARE

## 2024-11-27 VITALS
RESPIRATION RATE: 18 BRPM | DIASTOLIC BLOOD PRESSURE: 78 MMHG | OXYGEN SATURATION: 96 % | SYSTOLIC BLOOD PRESSURE: 137 MMHG | BODY MASS INDEX: 24.33 KG/M2 | HEART RATE: 63 BPM | WEIGHT: 160 LBS

## 2024-11-27 DIAGNOSIS — N83.201 CYST OF RIGHT OVARY: Primary | ICD-10-CM

## 2024-11-27 PROCEDURE — G0463 HOSPITAL OUTPT CLINIC VISIT: HCPCS | Performed by: OBSTETRICS & GYNECOLOGY

## 2024-11-27 RX ORDER — CEPHALEXIN 500 MG/1
CAPSULE ORAL
COMMUNITY
Start: 2024-08-20

## 2024-11-27 ASSESSMENT — PAIN SCALES - GENERAL: PAINLEVEL_OUTOF10: NO PAIN (0)

## 2024-11-27 NOTE — NURSING NOTE
"Oncology Rooming Note    November 27, 2024 11:53 AM   Domi Nolen is a 74 year old female who presents for:    Chief Complaint   Patient presents with    Oncology Clinic Visit     Gyn Onc follow up     Initial Vitals: /78 (BP Location: Right arm, Patient Position: Sitting, Cuff Size: Adult Regular)   Pulse 63   Resp 18   Wt 72.6 kg (160 lb)   SpO2 96%   BMI 24.33 kg/m   Estimated body mass index is 24.33 kg/m  as calculated from the following:    Height as of 11/1/23: 1.727 m (5' 8\").    Weight as of this encounter: 72.6 kg (160 lb). Body surface area is 1.87 meters squared.  No Pain (0) Comment: Data Unavailable   No LMP recorded. Patient is postmenopausal.  Allergies reviewed: Yes  Medications reviewed: Yes    Medications: Medication refills not needed today.  Pharmacy name entered into [facility]: [place], [place] - [address]    Frailty Screening:   Is the patient here for a new oncology consult visit in cancer care? 2. No      Clinical concerns: Feeling fine, no concerns.   Dr. Zaragoza was NOT notified.      Diana Spears RN              "

## 2024-11-27 NOTE — LETTER
2024      Domi Nolen  [address] [facility] [address]      Dear Colleague,    Thank you for referring your patient, Domi Nolen, to the [facility]. Please see a copy of my visit note below.                Follow Up Notes on Referred Patient    Date: 2024       Dr. Rajan Zaragoza MD  [address] [place] [address]       RE: Domi Nolen  : 1950  JASON: 2024      8 year old woman with a recent diagnosis of complex right ovarian cyst.   G0 breast cancer age 50 underwent a mastectomy as well as breast reconstruction and has been put on tamoxifen at that point. No postmenopausal bleeding she is eating and drinking well no nausea vomiting fever chills.  She did notice over last 6 months occasional right lower quadrant cramping.  Does not require medications for that.  She had a recent hip MRI which showed incidental finding of 3.9 cm complex right ovarian cyst, repeat ultrasound shows overall stable more simple appearing cyst has slightly increased to 4.5 cm.   6.  She of note did have an abnormal Pap smear 15 years ago, normal Pap smears since then.  Again no postmenopausal bleeding.  Of note she does have about 10 pounds weight loss since December which was intentional due to diet is eating and drinking well has no GI symptoms.           Past Medical History:  Breast cancer.  Cervical dysplasia.        Past Surgical History:  Appendectomy, cholecystectomy, mastectomy with breast reconstruction      Health Maintenance Due   Topic Date Due     DEXA  Never done     LIPID  Never done     ADVANCE CARE PLANNING  Never done     GLUCOSE  Never done     HEPATITIS C SCREENING  Never done     PHQ-2 (once per calendar year)  2024     INFLUENZA VACCINE (1) 2024     COVID-19 Vaccine (2024- season) 2024       Current Medications:     Current Outpatient Medications   Medication Sig Dispense Refill     aspirin 81 MG EC tablet  Take 81 mg by mouth       atorvastatin (LIPITOR) 20 MG tablet        calcium carbonate 600 mg-vitamin D 400 units (CALTRATE) 600-400 MG-UNIT per tablet Take 1 tablet by mouth       cephALEXin (KEFLEX) 500 MG capsule TAKE 4 Capsules BY MOUTH 1 HOUR PRIOR TO APPOINTMENT       citalopram (CELEXA) 40 MG tablet Take 40 mg by mouth       clonazePAM (KLONOPIN) 0.5 MG tablet        magnesium oxide (MAG-OX) 400 MG tablet Take 400 mg by mouth       Multiple Vitamin (MULTI-VITAMINS) TABS Take 1 tablet by mouth       Multiple Vitamins-Minerals (PRESERVISION AREDS PO) Take 2 capsules by mouth 2 times daily       pantoprazole (PROTONIX) 40 MG EC tablet Take 40 mg by mouth       tolterodine ER (DETROL LA) 4 MG 24 hr capsule Take 4 mg by mouth daily           Allergies:        Allergies   Allergen Reactions     Mirtazapine Other (See Comments)     Anger, homicidal     Duloxetine Anxiety     More anxiety, worse sleep.     Codeine Other (See Comments)     Abdominal Pain     Penicillins Other (See Comments)     Comment: Hives, Description:      Sulfanilamide Other (See Comments)     Comment: Rash, Description:         Social History:     Social History     Tobacco Use     Smoking status: Never     Passive exposure: Current     Smokeless tobacco: Never   Substance Use Topics     Alcohol use: Yes       History   Drug Use Unknown         Family History:         Family History   Problem Relation Age of Onset     Cancer Mother         skin ca     Cancer Father         skin ca     Melanoma Maternal Aunt          Physical Exam:     /78 (BP Location: Right arm, Patient Position: Sitting, Cuff Size: Adult Regular)   Pulse 63   Resp 18   Wt 72.6 kg (160 lb)   SpO2 96%   BMI 24.33 kg/m    Body mass index is 24.33 kg/m .    General Appearance: healthy and alert, no distress     Musculoskeletal: extremities non tender and without edema    Skin: no lesions or rashes     Neurological: normal gait, no gross  defects     Psychiatric: appropriate mood and affect                                   Assessment:    Domi Nolen is a 74 year old woman with a new diagnosis of right ovarian cyst.      A total of 22 minutes was spent with the patient, chart review, documentation, orders and counseling.     4.2 cm mainly simple right ovarian cyst, now 2.8cm   6  Personal history of breast cancer at 50  Histoy of cervical dysplasia 15 years ago, normal Pap smear HPV-     I discussed with the patient on the recent ultrasound the cyst has decreased and is now stable at 2.8 cm.  We will plan to do a follow-up ultrasound in 6 months.  We again discussed indications for removal based on size or increasing size.  Patient would prefer not to undergo surgery unless absolutely necessary given some significant issues she had prior with anesthesia with recent hip surgery.  She agrees with this plan of surgical care all questions were answered.  She is very appreciative of her care.           Ruth Vega MD, MS    Department of Obstetrics and Gynecology   Division of Gynecologic Oncology   [facility]  Phone: [phone]       CC  Patient Care Team:  Rachel Jaime DO as PCP - General (Family Practice)  Diana Ortiz MD as Assigned OBGYN Provider  Ruth Vega MD as MD (Gynecologic Oncology)  Ruth Vega MD as Assigned Cancer Care Provider  RUTH VEGA      Again, thank you for allowing me to participate in the care of your patient.        Sincerely,        Ruth Vega MD

## 2024-11-27 NOTE — PROGRESS NOTES
Follow Up Notes on Referred Patient    Date: 2024       Dr. Rajan Zaragoza MD  [address] [place] [address]       RE: Domi Nolen  : 1950  JASON: 2024      8 year old woman with a recent diagnosis of complex right ovarian cyst.   G0 breast cancer age 50 underwent a mastectomy as well as breast reconstruction and has been put on tamoxifen at that point. No postmenopausal bleeding she is eating and drinking well no nausea vomiting fever chills.  She did notice over last 6 months occasional right lower quadrant cramping.  Does not require medications for that.  She had a recent hip MRI which showed incidental finding of 3.9 cm complex right ovarian cyst, repeat ultrasound shows overall stable more simple appearing cyst has slightly increased to 4.5 cm.   6.  She of note did have an abnormal Pap smear 15 years ago, normal Pap smears since then.  Again no postmenopausal bleeding.  Of note she does have about 10 pounds weight loss since December which was intentional due to diet is eating and drinking well has no GI symptoms.           Past Medical History:  Breast cancer.  Cervical dysplasia.        Past Surgical History:  Appendectomy, cholecystectomy, mastectomy with breast reconstruction      Health Maintenance Due   Topic Date Due    DEXA  Never done    LIPID  Never done    ADVANCE CARE PLANNING  Never done    GLUCOSE  Never done    HEPATITIS C SCREENING  Never done    PHQ-2 (once per calendar year)  2024    INFLUENZA VACCINE (1) 2024    COVID-19 Vaccine ( season) 2024       Current Medications:     Current Outpatient Medications   Medication Sig Dispense Refill    aspirin 81 MG EC tablet Take 81 mg by mouth      atorvastatin (LIPITOR) 20 MG tablet       calcium carbonate 600 mg-vitamin D 400 units (CALTRATE) 600-400 MG-UNIT per tablet Take 1 tablet by mouth      cephALEXin (KEFLEX) 500 MG capsule TAKE 4 Capsules BY MOUTH 1 HOUR PRIOR TO  APPOINTMENT      citalopram (CELEXA) 40 MG tablet Take 40 mg by mouth      clonazePAM (KLONOPIN) 0.5 MG tablet       magnesium oxide (MAG-OX) 400 MG tablet Take 400 mg by mouth      Multiple Vitamin (MULTI-VITAMINS) TABS Take 1 tablet by mouth      Multiple Vitamins-Minerals (PRESERVISION AREDS PO) Take 2 capsules by mouth 2 times daily      pantoprazole (PROTONIX) 40 MG EC tablet Take 40 mg by mouth      tolterodine ER (DETROL LA) 4 MG 24 hr capsule Take 4 mg by mouth daily           Allergies:        Allergies   Allergen Reactions    Mirtazapine Other (See Comments)     Anger, homicidal    Duloxetine Anxiety     More anxiety, worse sleep.    Codeine Other (See Comments)     Abdominal Pain    Penicillins Other (See Comments)     Comment: Hives, Description:     Sulfanilamide Other (See Comments)     Comment: Rash, Description:         Social History:     Social History     Tobacco Use    Smoking status: Never     Passive exposure: Current    Smokeless tobacco: Never   Substance Use Topics    Alcohol use: Yes       History   Drug Use Unknown         Family History:         Family History   Problem Relation Age of Onset    Cancer Mother         skin ca    Cancer Father         skin ca    Melanoma Maternal Aunt          Physical Exam:     /78 (BP Location: Right arm, Patient Position: Sitting, Cuff Size: Adult Regular)   Pulse 63   Resp 18   Wt 72.6 kg (160 lb)   SpO2 96%   BMI 24.33 kg/m    Body mass index is 24.33 kg/m .    General Appearance: healthy and alert, no distress     Musculoskeletal: extremities non tender and without edema    Skin: no lesions or rashes     Neurological: normal gait, no gross defects     Psychiatric: appropriate mood and affect                                   Assessment:    Domi Nolen is a 74 year old woman with a new diagnosis of right ovarian cyst.      A total of 22 minutes was spent with the patient, chart review, documentation, orders and counseling.     4.2 cm  mainly simple right ovarian cyst, now 2.8cm   6  Personal history of breast cancer at 50  Histoy of cervical dysplasia 15 years ago, normal Pap smear HPV-     I discussed with the patient on the recent ultrasound the cyst has decreased and is now stable at 2.8 cm.  We will plan to do a follow-up ultrasound in 6 months.  We again discussed indications for removal based on size or increasing size.  Patient would prefer not to undergo surgery unless absolutely necessary given some significant issues she had prior with anesthesia with recent hip surgery.  She agrees with this plan of surgical care all questions were answered.  She is very appreciative of her care.           Ruth Zaragoza MD, MS    Department of Obstetrics and Gynecology   Division of Gynecologic Oncology   [facility]  Phone: [phone]       CC  Patient Care Team:  Rachel Jaime DO as PCP - General (Family Practice)  Diana Ortiz MD as Assigned OBGYN Provider  Ruth Zaragoza MD as MD (Gynecologic Oncology)  Ruth Zaragoza MD as Assigned Cancer Care Provider  RUTH ZARAGOZA

## 2025-06-09 ENCOUNTER — HOSPITAL ENCOUNTER (OUTPATIENT)
Dept: ULTRASOUND IMAGING | Facility: CLINIC | Age: 75
Discharge: HOME | End: 2025-06-09
Attending: OBSTETRICS & GYNECOLOGY | Admitting: OBSTETRICS & GYNECOLOGY
Payer: MEDICARE

## 2025-06-09 DIAGNOSIS — N83.201 CYST OF RIGHT OVARY: ICD-10-CM

## 2025-06-09 PROCEDURE — 76830 TRANSVAGINAL US NON-OB: CPT

## 2025-06-11 ENCOUNTER — ONCOLOGY VISIT (OUTPATIENT)
Dept: ONCOLOGY | Facility: HOSPITAL | Age: 75
End: 2025-06-11
Attending: OBSTETRICS & GYNECOLOGY
Payer: MEDICARE

## 2025-06-11 VITALS
OXYGEN SATURATION: 100 % | WEIGHT: 157.6 LBS | TEMPERATURE: 97.7 F | BODY MASS INDEX: 23.96 KG/M2 | HEART RATE: 55 BPM | RESPIRATION RATE: 16 BRPM | DIASTOLIC BLOOD PRESSURE: 79 MMHG | SYSTOLIC BLOOD PRESSURE: 131 MMHG

## 2025-06-11 DIAGNOSIS — N83.201 CYST OF RIGHT OVARY: Primary | ICD-10-CM

## 2025-06-11 DIAGNOSIS — R19.09 OTHER INTRA-ABDOMINAL AND PELVIC SWELLING, MASS AND LUMP: ICD-10-CM

## 2025-06-11 PROCEDURE — 99213 OFFICE O/P EST LOW 20 MIN: CPT | Performed by: OBSTETRICS & GYNECOLOGY

## 2025-06-11 PROCEDURE — G0463 HOSPITAL OUTPT CLINIC VISIT: HCPCS | Performed by: OBSTETRICS & GYNECOLOGY

## 2025-06-11 ASSESSMENT — PAIN SCALES - GENERAL: PAINLEVEL_OUTOF10: NO PAIN (0)

## 2025-06-11 NOTE — NURSING NOTE
"Oncology Rooming Note    June 11, 2025 10:04 AM   Domi Nolen is a 75 year old female who presents for:    Chief Complaint   Patient presents with    Oncology Clinic Visit     Gyn Onc follow up     Initial Vitals: /79   Pulse 55   Temp 97.7  F (36.5  C)   Resp 16   Wt 71.5 kg (157 lb 9.6 oz)   SpO2 100%   BMI 23.96 kg/m   Estimated body mass index is 23.96 kg/m  as calculated from the following:    Height as of 11/1/23: 1.727 m (5' 8\").    Weight as of this encounter: 71.5 kg (157 lb 9.6 oz). Body surface area is 1.85 meters squared.  No Pain (0) Comment: Data Unavailable   No LMP recorded. Patient is postmenopausal.  Allergies reviewed: Yes  Medications reviewed: Yes    Medications: Medication refills not needed today.  Pharmacy name entered into [facility]: [place], [place] - [facility] [address]    Frailty Screening:   Is the patient here for a new oncology consult visit in cancer care? 2. No    PHQ9:  Did this patient require a PHQ9?: Yes   If the patient required a PHQ9 assessment, did the results require a follow up with the Provider/Nurse?: No      Clinical concerns: None, feeling well.  Dr. Zaragoza was NOT notified.      Diana Spears RN              "

## 2025-06-11 NOTE — LETTER
2025      Domi Nolen  [address] [facility] [address]      Dear Colleague,    Thank you for referring your patient, Domi Nolen, to the [facility]. Please see a copy of my visit note below.                Follow Up Notes on Referred Patient    Date: 2025       Dr. Rajan Zaragoza MD  [address] [place] [address]       RE: Domi Nolen  : 1950  JASON: 2025      75 year old woman with a recent diagnosis of complex right ovarian cyst.   G0 breast cancer age 50 underwent a mastectomy as well as breast reconstruction and has been put on tamoxifen at that point. No postmenopausal bleeding she is eating and drinking well no nausea vomiting fever chills.  She has been doing well since last time we have seen her she is eating drinking well has no nausea vomit fever chills no change in urinary or bowel habits.  Of note she did have an episode about a year ago for a month of diarrhea that have resolved.  She of note did have an abnormal Pap smear 15 years ago, normal Pap smears since then.  Again no postmenopausal bleeding.  Of note she does have about 10 pounds weight loss since December which was intentional due to diet is eating and drinking well has no GI symptoms.           Past Medical History:  Breast cancer.  Cervical dysplasia.        Past Surgical History:  Appendectomy, cholecystectomy, mastectomy with breast reconstruction      Health Maintenance Due   Topic Date Due     LIPID  Never done     ADVANCE CARE PLANNING  Never done     DIABETES SCREENING  Never done     COVID-19 VACCINE ( season) 2025       Current Medications:     Current Outpatient Medications   Medication Sig Dispense Refill     atorvastatin (LIPITOR) 20 MG tablet        calcium carbonate 600 mg-vitamin D 400 units (CALTRATE) 600-400 MG-UNIT per tablet Take 1 tablet by mouth       carboxymethylcellulose (REFRESH LIQUIGEL) 1 % ophthalmic solution Place 1  drop into both eyes 2 times daily.       cephALEXin (KEFLEX) 500 MG capsule TAKE 4 Capsules BY MOUTH 1 HOUR PRIOR TO APPOINTMENT       citalopram (CELEXA) 40 MG tablet Take 40 mg by mouth       clonazePAM (KLONOPIN) 0.5 MG tablet        magnesium oxide (MAG-OX) 400 MG tablet Take 400 mg by mouth       Multiple Vitamins-Minerals (PRESERVISION AREDS PO) Take 2 capsules by mouth 2 times daily       pantoprazole (PROTONIX) 40 MG EC tablet Take 40 mg by mouth       tolterodine ER (DETROL LA) 4 MG 24 hr capsule Take 4 mg by mouth daily       aspirin 81 MG EC tablet Take 81 mg by mouth (Patient not taking: Reported on 6/11/2025)           Allergies:        Allergies   Allergen Reactions     Mirtazapine Other (See Comments)     Anger, homicidal     Duloxetine Anxiety     More anxiety, worse sleep.     Codeine Other (See Comments)     Abdominal Pain     Penicillins Other (See Comments)     Comment: Hives, Description:      Sulfanilamide Other (See Comments)     Comment: Rash, Description:         Social History:     Social History     Tobacco Use     Smoking status: Never     Passive exposure: Current     Smokeless tobacco: Never   Substance Use Topics     Alcohol use: Yes       History   Drug Use Unknown         Family History:       Family History   Problem Relation Age of Onset     Cancer Mother         skin ca     Cancer Father         skin ca     Melanoma Maternal Aunt          Physical Exam:     /79   Pulse 55   Temp 97.7  F (36.5  C)   Resp 16   Wt 71.5 kg (157 lb 9.6 oz)   SpO2 100%   BMI 23.96 kg/m    Body mass index is 23.96 kg/m .    General Appearance:healthy and alert, no distress     Musculoskeletal:extremities non tender and without edema     Skin:    no lesions or rashes      Neurological:normal gait, no gross defects                Psychiatric:      appropriate mood and affect                                     Assessment:     Domi Nolen is a 75 year old woman with a new diagnosis of right  ovarian cyst.      A total of 21 minutes was spent with the patient, chart review, documentation, orders and counseling.     4.2 cm mainly simple right ovarian cyst, now 2.8cm, decreased to 2.3cm   6  Personal history of breast cancer at 50  Histoy of cervical dysplasia 15 years ago, normal Pap smear HPV-  Small amount of fluid in the uterus stripe 5mm     I discussed with the patient on the recent ultrasound the cyst has decreased and is now decreased to 2.3cm from 2.8 cm.  She does have a small amount of fluid in her uterine cavity without any bleeding stripe is 5 mm before 2 or 3 mm.  Fluid has been there prior.  She will contact us if she has any vaginal bleeding or discharge.  We will plan to do a follow-up ultrasound in 6 months.  We again discussed indications for removal based on size or increasing size.  Patient would prefer not to undergo surgery unless absolutely necessary given some significant issues she had prior with anesthesia with recent hip surgery.  She agrees with this plan of surgical care all questions were answered.  She is very appreciative of her care.           Ruth Zaragoza MD, MS    Department of Obstetrics and Gynecology   Division of Gynecologic Oncology   [facility]  Phone: [phone]    CC  Patient Care Team:  Rachel Jaime DO as PCP - General (Family Practice)  Ruth Zaragoza MD as MD (Gynecologic Oncology)  Ruth Zaragoza MD as Assigned Cancer Care Provider  RUTH ZARAGOZA      Again, thank you for allowing me to participate in the care of your patient.        Sincerely,        Ruth Zaragoza MD    Electronically signed

## 2025-06-11 NOTE — PROGRESS NOTES
Follow Up Notes on Referred Patient    Date: 2025       Dr. Rajan Zaragoza MD  [address] [place] [address]       RE: Domi Nolen  : 1950  JASON: 2025      75 year old woman with a recent diagnosis of complex right ovarian cyst.   G0 breast cancer age 50 underwent a mastectomy as well as breast reconstruction and has been put on tamoxifen at that point. No postmenopausal bleeding she is eating and drinking well no nausea vomiting fever chills.  She has been doing well since last time we have seen her she is eating drinking well has no nausea vomit fever chills no change in urinary or bowel habits.  Of note she did have an episode about a year ago for a month of diarrhea that have resolved.  She of note did have an abnormal Pap smear 15 years ago, normal Pap smears since then.  Again no postmenopausal bleeding.  Of note she does have about 10 pounds weight loss since December which was intentional due to diet is eating and drinking well has no GI symptoms.           Past Medical History:  Breast cancer.  Cervical dysplasia.        Past Surgical History:  Appendectomy, cholecystectomy, mastectomy with breast reconstruction      Health Maintenance Due   Topic Date Due    LIPID  Never done    ADVANCE CARE PLANNING  Never done    DIABETES SCREENING  Never done    COVID-19 VACCINE ( season) 2025       Current Medications:     Current Outpatient Medications   Medication Sig Dispense Refill    atorvastatin (LIPITOR) 20 MG tablet       calcium carbonate 600 mg-vitamin D 400 units (CALTRATE) 600-400 MG-UNIT per tablet Take 1 tablet by mouth      carboxymethylcellulose (REFRESH LIQUIGEL) 1 % ophthalmic solution Place 1 drop into both eyes 2 times daily.      cephALEXin (KEFLEX) 500 MG capsule TAKE 4 Capsules BY MOUTH 1 HOUR PRIOR TO APPOINTMENT      citalopram (CELEXA) 40 MG tablet Take 40 mg by mouth      clonazePAM (KLONOPIN) 0.5 MG tablet       magnesium oxide  (MAG-OX) 400 MG tablet Take 400 mg by mouth      Multiple Vitamins-Minerals (PRESERVISION AREDS PO) Take 2 capsules by mouth 2 times daily      pantoprazole (PROTONIX) 40 MG EC tablet Take 40 mg by mouth      tolterodine ER (DETROL LA) 4 MG 24 hr capsule Take 4 mg by mouth daily      aspirin 81 MG EC tablet Take 81 mg by mouth (Patient not taking: Reported on 6/11/2025)           Allergies:        Allergies   Allergen Reactions    Mirtazapine Other (See Comments)     Anger, homicidal    Duloxetine Anxiety     More anxiety, worse sleep.    Codeine Other (See Comments)     Abdominal Pain    Penicillins Other (See Comments)     Comment: Hives, Description:     Sulfanilamide Other (See Comments)     Comment: Rash, Description:         Social History:     Social History     Tobacco Use    Smoking status: Never     Passive exposure: Current    Smokeless tobacco: Never   Substance Use Topics    Alcohol use: Yes       History   Drug Use Unknown         Family History:       Family History   Problem Relation Age of Onset    Cancer Mother         skin ca    Cancer Father         skin ca    Melanoma Maternal Aunt          Physical Exam:     /79   Pulse 55   Temp 97.7  F (36.5  C)   Resp 16   Wt 71.5 kg (157 lb 9.6 oz)   SpO2 100%   BMI 23.96 kg/m    Body mass index is 23.96 kg/m .    General Appearance:healthy and alert, no distress     Musculoskeletal:extremities non tender and without edema     Skin:    no lesions or rashes      Neurological:normal gait, no gross defects                Psychiatric:      appropriate mood and affect                                     Assessment:     Domi Nolen is a 75 year old woman with a new diagnosis of right ovarian cyst.      A total of 21 minutes was spent with the patient, chart review, documentation, orders and counseling.     4.2 cm mainly simple right ovarian cyst, now 2.8cm, decreased to 2.3cm   6  Personal history of breast cancer at 50  Histoy of cervical  dysplasia 15 years ago, normal Pap smear HPV-  Small amount of fluid in the uterus stripe 5mm     I discussed with the patient on the recent ultrasound the cyst has decreased and is now decreased to 2.3cm from 2.8 cm.  She does have a small amount of fluid in her uterine cavity without any bleeding stripe is 5 mm before 2 or 3 mm.  Fluid has been there prior.  She will contact us if she has any vaginal bleeding or discharge.  We will plan to do a follow-up ultrasound in 6 months.  We again discussed indications for removal based on size or increasing size.  Patient would prefer not to undergo surgery unless absolutely necessary given some significant issues she had prior with anesthesia with recent hip surgery.  She agrees with this plan of surgical care all questions were answered.  She is very appreciative of her care.           Ruth Zaragoza MD, MS    Department of Obstetrics and Gynecology   Division of Gynecologic Oncology   [facility]  Phone: [phone]    CC  Patient Care Team:  Rachel Jaime DO as PCP - General (Family Practice)  Ruth Zaragoza MD as MD (Gynecologic Oncology)  Ruth Zaragoza MD as Assigned Cancer Care Provider  RUTH ZARAGOZA